# Patient Record
Sex: FEMALE | Race: WHITE | Employment: OTHER | URBAN - METROPOLITAN AREA
[De-identification: names, ages, dates, MRNs, and addresses within clinical notes are randomized per-mention and may not be internally consistent; named-entity substitution may affect disease eponyms.]

---

## 2018-10-08 ENCOUNTER — HOSPITAL ENCOUNTER (EMERGENCY)
Age: 68
Discharge: HOME | End: 2018-10-08
Payer: MEDICARE

## 2018-10-08 ENCOUNTER — APPOINTMENT (OUTPATIENT)
Dept: CT IMAGING | Age: 68
End: 2018-10-08
Payer: MEDICARE

## 2018-10-08 VITALS
SYSTOLIC BLOOD PRESSURE: 123 MMHG | HEIGHT: 62 IN | TEMPERATURE: 97.5 F | WEIGHT: 187 LBS | HEART RATE: 80 BPM | DIASTOLIC BLOOD PRESSURE: 71 MMHG | BODY MASS INDEX: 34.41 KG/M2 | OXYGEN SATURATION: 94 % | RESPIRATION RATE: 15 BRPM
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
## 2018-10-08 DIAGNOSIS — S00.531A CONTUSION OF LIP, INITIAL ENCOUNTER: ICD-10-CM

## 2018-10-08 DIAGNOSIS — S09.90XA INJURY OF HEAD, INITIAL ENCOUNTER: ICD-10-CM

## 2018-10-08 DIAGNOSIS — S02.2XXA CLOSED FRACTURE OF NASAL BONE, INITIAL ENCOUNTER: ICD-10-CM

## 2018-10-08 PROCEDURE — 70450 CT HEAD/BRAIN W/O DYE: CPT

## 2018-10-08 PROCEDURE — 6370000000 HC RX 637 (ALT 250 FOR IP): Performed by: PHYSICIAN ASSISTANT

## 2018-10-08 PROCEDURE — 72125 CT NECK SPINE W/O DYE: CPT

## 2018-10-08 PROCEDURE — 70486 CT MAXILLOFACIAL W/O DYE: CPT

## 2018-10-08 PROCEDURE — 99284 EMERGENCY DEPT VISIT MOD MDM: CPT

## 2018-10-08 RX ORDER — HYDROCODONE BITARTRATE AND ACETAMINOPHEN 5; 325 MG/1; MG/1
1 TABLET ORAL ONCE
Status: COMPLETED | OUTPATIENT
Start: 2018-10-08 | End: 2018-10-08

## 2018-10-08 RX ORDER — HYDROCODONE BITARTRATE AND ACETAMINOPHEN 5; 325 MG/1; MG/1
1 TABLET ORAL EVERY 6 HOURS PRN
Qty: 10 TABLET | Refills: 0 | Status: SHIPPED | OUTPATIENT
Start: 2018-10-08 | End: 2018-10-11

## 2018-10-08 RX ORDER — AMOXICILLIN AND CLAVULANATE POTASSIUM 875; 125 MG/1; MG/1
1 TABLET, FILM COATED ORAL ONCE
Status: COMPLETED | OUTPATIENT
Start: 2018-10-08 | End: 2018-10-08

## 2018-10-08 RX ORDER — AMOXICILLIN AND CLAVULANATE POTASSIUM 875; 125 MG/1; MG/1
1 TABLET, FILM COATED ORAL 2 TIMES DAILY
Qty: 20 TABLET | Refills: 0 | Status: SHIPPED | OUTPATIENT
Start: 2018-10-08 | End: 2018-10-18

## 2018-10-08 RX ADMIN — AMOXICILLIN AND CLAVULANATE POTASSIUM 1 TABLET: 875; 125 TABLET, FILM COATED ORAL at 17:19

## 2018-10-08 RX ADMIN — HYDROCODONE BITARTRATE AND ACETAMINOPHEN 1 TABLET: 5; 325 TABLET ORAL at 15:50

## 2018-10-08 ASSESSMENT — PAIN SCALES - GENERAL
PAINLEVEL_OUTOF10: 4
PAINLEVEL_OUTOF10: 4
PAINLEVEL_OUTOF10: 0

## 2018-10-08 ASSESSMENT — PAIN DESCRIPTION - PAIN TYPE: TYPE: ACUTE PAIN

## 2018-10-08 ASSESSMENT — PAIN SCALES - WONG BAKER: WONGBAKER_NUMERICALRESPONSE: 0

## 2018-10-08 ASSESSMENT — PAIN DESCRIPTION - LOCATION: LOCATION: NOSE

## 2018-10-08 NOTE — ED PROVIDER NOTES
Evaluated by advanced practice provider          [facility]  eMERGENCY dEPARTMENT eNCOUnter        Pt Name: Brittney Merino  MRN: [id number]  [place] 1950  Date of evaluation: 10/8/2018  Provider: Luz Maria Jimenez PA-C  PCP: Elodia Chen  ED Attending: No att. providers found    [facility]       Chief Complaint   Patient presents with    Fall     Pt fell and hit nose on cement. Denies other injury. Denies blood thinner use.  +laceration to nose. HISTORY OF PRESENT ILLNESS   (Location/Symptom, Timing/Onset, Context/Setting, Quality, Duration, Modifying Factors, Severity)  Note limiting factors. Brittney Merino is a 77 y.o. female  presents to the emergency department after a fall. The patient states that she was walking around the car and tripped over a extension cord. She states that she fell face first and hit her nose and face on the cement. She denies any loss of consciousness, headache, visual change, nausea, vomiting, neck pain, back pain, weakness, numbness, tingling, confusion or abnormal gait. She states that she does use a cane for ambulation. She states that her nose and lip hurt and she describes it as a 4 out of 10 pain, but cannot characterize it further. She has had some epistaxis that has stopped since. She denies being on any blood thinners. Nursing Notes were all reviewed and agreed with or any disagreements were addressed  in the HPI. REVIEW OF SYSTEMS    (2-9 systems for level 4, 10 or more for level 5)     Review of Systems    Positives and Pertinent negatives as per HPI. Except as noted above in the ROS, all other systems were reviewed and negative.        PAST MEDICAL HISTORY     Past Medical History:   Diagnosis Date    Diabetes mellitus ([address]     Hyperlipidemia     Hypertension     Kidney stone     Thyroid cancer [facility]     Thyroid disease          SURGICAL HISTORY       Past Surgical History:   Procedure Laterality Date    bridge. Currently hemostatic, but mildly tender. Eyes: Pupils are equal, round, and reactive to light. Conjunctivae and EOM are normal. Right eye exhibits no discharge. Left eye exhibits no discharge. No scleral icterus. Neck: Normal range of motion. Neck supple. Cardiovascular: Normal rate, regular rhythm, normal heart sounds and intact distal pulses. Exam reveals no gallop and no friction rub. No murmur heard. Pulmonary/Chest: Effort normal and breath sounds normal. No respiratory distress. She has no wheezes. She has no rales. She exhibits no tenderness. Musculoskeletal:        Cervical back: Normal.        Thoracic back: Normal.        Lumbar back: Normal.   Neurological: She is alert and oriented to person, place, and time. She has normal strength and normal reflexes. She displays no atrophy, no tremor and normal reflexes. No cranial nerve deficit or sensory deficit. She exhibits normal muscle tone. She displays no seizure activity. Coordination normal.   Reflex Scores:       Tricep reflexes are 2+ on the right side and 2+ on the left side. Bicep reflexes are 2+ on the right side and 2+ on the left side. Brachioradialis reflexes are 2+ on the right side and 2+ on the left side. Patellar reflexes are 2+ on the right side and 2+ on the left side. Achilles reflexes are 2+ on the right side and 2+ on the left side. Finger-to-nose normal bilaterally  No pronator drift. Skin: Skin is warm and dry. She is not diaphoretic. No pallor. Psychiatric: She has a normal mood and affect. Her behavior is normal. Thought content normal.   Nursing note and vitals reviewed. DIAGNOSTIC RESULTS   LABS:    Labs Reviewed - No data to display    All other labs were within normal range or not returned as of this dictation. EKG:  All EKG's are interpreted by the Emergency Department Physician who either signs or Co-signs this chart in the absence of a cardiologist.  Please see their note Relevant Medical/Surgical History: htn dm thyroid cancer FINDINGS: BRAIN/VENTRICLES: There is no acute intracranial hemorrhage, mass effect or midline shift. No abnormal extra-axial fluid collection. The gray-white differentiation is maintained without evidence of an acute infarct. There is no evidence of hydrocephalus. ORBITS: The visualized portion of the orbits demonstrate no acute abnormality. SINUSES: Note is made of nondisplaced nasal bone fractures. There is also increased density seen within the maxillary sinuses. The mastoid air cells are clear. Duane Bourdon SOFT TISSUES/SKULL:  No acute abnormality of the visualized skull or soft tissues. 1. No acute intracranial abnormality. 2. Nasal bone fractures with increased density involving the maxillary sinuses. I would suggest CT scan face to evaluate for other facial fractures. Ct Facial Bones Wo Contrast    Result Date: 10/8/2018  EXAMINATION: CT OF THE FACE WITHOUT CONTRAST  10/8/2018 4:24 pm TECHNIQUE: CT of the face was performed without the administration of intravenous contrast. Multiplanar reformatted images are provided for review. Dose modulation, iterative reconstruction, and/or weight based adjustment of the mA/kV was utilized to reduce the radiation dose to as low as reasonably achievable. COMPARISON: CT head earlier today HISTORY: ORDERING SYSTEM PROVIDED HISTORY: fall; head injury, nose swelling, bruising and laceration TECHNOLOGIST PROVIDED HISTORY: Ordering Physician Provided Reason for Exam: Fall (Pt fell and hit nose on cement. Denies other injury. Denies blood thinner use.  + laceration to nose.) Acuity: Acute Type of Exam: Initial Relevant Medical/Surgical History: htn dm thyroid cancer FINDINGS: FACIAL BONES:  The maxilla, pterygoid plates and zygomatic arches are intact. The mandible is intact. The mandibular condyles are normally situated. The maxillary nasal processes are intact. Nondisplaced right nasal bone fracture.   Nondisplaced degenerative disease of the cervical spine. SOFT TISSUES: There is no prevertebral soft tissue swelling. 1. No acute abnormality involving the cervical spine. 2. Grade 1 anterolisthesis of C3 on 4 likely due to degenerative disease. PROCEDURES   Unless otherwise noted below, none     Lac Repair  Date/Time: 10/8/2018 6:04 PM  Performed by: Tony Mercado by: Zeeshan Macdonald     Consent:     Consent obtained:  Verbal    Consent given by:  Patient  Anesthesia (see MAR for exact dosages): Anesthesia method:  Local infiltration    Local anesthetic:  Bupivacaine 0.25% w/o epi  Laceration details:     Location:  Face    Face location:  Nose    Length (cm):  1.5    Depth (mm):  3  Repair type:     Repair type:  Simple  Exploration:     Hemostasis achieved with:  Direct pressure    Wound exploration: wound explored through full range of motion and entire depth of wound probed and visualized      Wound extent: no areolar tissue violation noted, no fascia violation noted, no foreign bodies/material noted, no muscle damage noted, no nerve damage noted, no tendon damage noted, no underlying fracture noted and no vascular damage noted      Contaminated: no    Treatment:     Area cleansed with:  Dena    Amount of cleaning:  Standard    Irrigation solution:  Tap water    Irrigation method:  Tap    Visualized foreign bodies/material removed: no    Skin repair:     Repair method:  Sutures    Suture size:  0-0    Suture material:  Prolene    Suture technique:  Simple interrupted    Number of sutures:  5  Approximation:     Approximation:  Close    Vermilion border: well-aligned    Post-procedure details:     Dressing:  Open (no dressing)    Patient tolerance of procedure:   Tolerated well, no immediate complications        CRITICAL CARE TIME   N/A    CONSULTS:  None      EMERGENCY DEPARTMENT COURSE and DIFFERENTIAL DIAGNOSIS/MDM:   Vitals:    Vitals:    10/08/18 1520 10/08/18 1625   BP: (!) 158/91

## 2018-10-08 NOTE — ED TRIAGE NOTES
Chief Complaint   Patient presents with    Fall     Pt fell and hit nose on cement. Denies other injury. Denies blood thinner use.  +laceration to nose.

## 2021-08-10 ENCOUNTER — OFFICE VISIT (OUTPATIENT)
Dept: ORTHOPEDIC SURGERY | Age: 71
End: 2021-08-10
Payer: MEDICARE

## 2021-08-10 VITALS — HEIGHT: 62 IN | BODY MASS INDEX: 33.68 KG/M2 | WEIGHT: 183 LBS

## 2021-08-10 DIAGNOSIS — M25.562 ACUTE PAIN OF LEFT KNEE: ICD-10-CM

## 2021-08-10 DIAGNOSIS — M17.11 PRIMARY OSTEOARTHRITIS OF RIGHT KNEE: Primary | ICD-10-CM

## 2021-08-10 PROCEDURE — 1123F ACP DISCUSS/DSCN MKR DOCD: CPT | Performed by: ORTHOPAEDIC SURGERY

## 2021-08-10 PROCEDURE — G8419 CALC BMI OUT NRM PARAM NOF/U: HCPCS | Performed by: ORTHOPAEDIC SURGERY

## 2021-08-10 PROCEDURE — 4040F PNEUMOC VAC/ADMIN/RCVD: CPT | Performed by: ORTHOPAEDIC SURGERY

## 2021-08-10 PROCEDURE — G8400 PT W/DXA NO RESULTS DOC: HCPCS | Performed by: ORTHOPAEDIC SURGERY

## 2021-08-10 PROCEDURE — 1090F PRES/ABSN URINE INCON ASSESS: CPT | Performed by: ORTHOPAEDIC SURGERY

## 2021-08-10 PROCEDURE — G8427 DOCREV CUR MEDS BY ELIG CLIN: HCPCS | Performed by: ORTHOPAEDIC SURGERY

## 2021-08-10 PROCEDURE — 1036F TOBACCO NON-USER: CPT | Performed by: ORTHOPAEDIC SURGERY

## 2021-08-10 PROCEDURE — 3017F COLORECTAL CA SCREEN DOC REV: CPT | Performed by: ORTHOPAEDIC SURGERY

## 2021-08-10 PROCEDURE — 99203 OFFICE O/P NEW LOW 30 MIN: CPT | Performed by: ORTHOPAEDIC SURGERY

## 2021-08-10 NOTE — PROGRESS NOTES
Socioeconomic History    Marital status:      Spouse name: Not on file    Number of children: Not on file    Years of education: Not on file    Highest education level: Not on file   Occupational History    Not on file   Tobacco Use    Smoking status: Never Smoker    Smokeless tobacco: Never Used   Vaping Use    Vaping Use: Never used   Substance and Sexual Activity    Alcohol use: No    Drug use: No    Sexual activity: Yes     Partners: Male   Other Topics Concern    Not on file   Social History Narrative    Not on file     Social Determinants of Health     Financial Resource Strain:     Difficulty of Paying Living Expenses:    Food Insecurity:     Worried About Running Out of Food in the Last Year:     [address] in the Last Year:    Transportation Needs:     Lack of Transportation (Medical):  Lack of Transportation (Non-Medical):    Physical Activity:     Days of Exercise per Week:     Minutes of Exercise per Session:    Stress:     Feeling of Stress :    Social Connections:     Frequency of Communication with Friends and Family:     Frequency of Social Gatherings with Friends and Family:     Attends Restoration Services:     Active Member of Clubs or Organizations:     Attends Club or Organization Meetings:     Marital Status:    Intimate Partner Violence:     Fear of Current or Ex-Partner:     Emotionally Abused:     Physically Abused:     Sexually Abused:        Family HISTORY    No family history on file. PHYSICAL EXAM    Vital Signs:  Ht 5' 2\" (1.575 m)   Wt 183 lb (83 kg)   BMI 33.47 kg/m²   General Appearance:  Normal body habitus. Alert and oriented to person, place, and time. Affect:  Normal.   Gait: Antalgic with mild genu varum. Good balance and coordination. Skin:  Intact. Sensation:  Intact. Strength:  Intact. Reflexes:  Intact. Pulses:  Intact.    Knee Exam:    Effusion: 1+    Range of Motion Right Left   Extension 0 0   Flexion 110 110 Provocative Test Right Left    Positive Negative Positive Negative   Anterior drawer [] [] [] []   Lachman [] [] [] []   Posterior drawer [] [] [] []   Varus testing [] [x] [] [x]   Valgus testing [] [x] [] [x]   Joint line tenderness [x] [] [] [x]     Additional Exam Comments: Her neurocirculatory lymphatic exam is unremarkable and symmetric to both lower extremities. She does have medial compartment pain to direct palpation and most of it appears to be along the proximal tibia on the medial side anteriorly. She has no gross instability but does feel bone-on-bone changes when she goes to  the shower to wash the bottom of her foot. IMAGING STUDIES    X-rays 3 views of the right knee taken today demonstrate severe osteoarthritis mostly in the medial compartment of the right knee. She is not quite bone-on-bone but its less than 50% of joint space remaining. IMPRESSION    Right knee pain secondary to osteoarthritis with possible stress reaction    PLAN      1. Conservative care options including physical therapy, NSAIDs, bracing, and activity modification were discussed. 2.  The indications for therapeutic injections were discussed. 3.  The indications for additional imaging studies were discussed. 4.  After considering the various options discussed, the patient elected to pursue a course that includes an MRI of the right knee since she is failed to improve with other modalities prior to consideration of knee replacement surgery. She just uses a cane for ambulation and prefers not to have knee replacement surgery if at all possible. I told her since she has had some benefit from Visco in the past that she may be a candidate for that in the future but that we would do something other than Durolane most likely.   Additionally I told her that if the MRI shows that she has something such as a stress reaction that she may benefit from a newer procedure to fix that with bone substitute but that I would not recommend anything along the lines of arthroscopy just for meniscectomy since she has severe arthritic change in the medial compartment.

## 2021-08-17 ENCOUNTER — HOSPITAL ENCOUNTER (OUTPATIENT)
Dept: MRI IMAGING | Age: 71
Discharge: HOME | End: 2021-08-17
Payer: MEDICARE

## 2021-08-17 DIAGNOSIS — M25.562 ACUTE PAIN OF LEFT KNEE: ICD-10-CM

## 2021-08-17 PROCEDURE — 73721 MRI JNT OF LWR EXTRE W/O DYE: CPT

## 2021-08-25 ENCOUNTER — OFFICE VISIT (OUTPATIENT)
Dept: ORTHOPEDIC SURGERY | Age: 71
End: 2021-08-25
Payer: MEDICARE

## 2021-08-25 VITALS — BODY MASS INDEX: 33.68 KG/M2 | WEIGHT: 183 LBS | HEIGHT: 62 IN

## 2021-08-25 DIAGNOSIS — M23.300 DEGENERATIVE TEAR OF LATERAL MENISCUS OF RIGHT KNEE: ICD-10-CM

## 2021-08-25 DIAGNOSIS — M23.203 DEGENERATIVE TEAR OF MEDIAL MENISCUS OF RIGHT KNEE: Primary | ICD-10-CM

## 2021-08-25 DIAGNOSIS — M84.469G INSUFFICIENCY FRACTURE OF TIBIA WITH DELAYED HEALING, SUBSEQUENT ENCOUNTER: ICD-10-CM

## 2021-08-25 PROBLEM — M84.469A INSUFFICIENCY FRACTURE OF TIBIA: Status: ACTIVE | Noted: 2021-08-25

## 2021-08-25 PROCEDURE — 1036F TOBACCO NON-USER: CPT | Performed by: ORTHOPAEDIC SURGERY

## 2021-08-25 PROCEDURE — 4040F PNEUMOC VAC/ADMIN/RCVD: CPT | Performed by: ORTHOPAEDIC SURGERY

## 2021-08-25 PROCEDURE — 1090F PRES/ABSN URINE INCON ASSESS: CPT | Performed by: ORTHOPAEDIC SURGERY

## 2021-08-25 PROCEDURE — G8417 CALC BMI ABV UP PARAM F/U: HCPCS | Performed by: ORTHOPAEDIC SURGERY

## 2021-08-25 PROCEDURE — 99213 OFFICE O/P EST LOW 20 MIN: CPT | Performed by: ORTHOPAEDIC SURGERY

## 2021-08-25 PROCEDURE — G8400 PT W/DXA NO RESULTS DOC: HCPCS | Performed by: ORTHOPAEDIC SURGERY

## 2021-08-25 PROCEDURE — 3017F COLORECTAL CA SCREEN DOC REV: CPT | Performed by: ORTHOPAEDIC SURGERY

## 2021-08-25 PROCEDURE — G8427 DOCREV CUR MEDS BY ELIG CLIN: HCPCS | Performed by: ORTHOPAEDIC SURGERY

## 2021-08-25 PROCEDURE — 1123F ACP DISCUSS/DSCN MKR DOCD: CPT | Performed by: ORTHOPAEDIC SURGERY

## 2021-09-02 DIAGNOSIS — M23.203 DEGENERATIVE TEAR OF MEDIAL MENISCUS OF RIGHT KNEE: Primary | ICD-10-CM

## 2021-09-02 DIAGNOSIS — M84.469G INSUFFICIENCY FRACTURE OF TIBIA WITH DELAYED HEALING, SUBSEQUENT ENCOUNTER: ICD-10-CM

## 2021-09-14 ENCOUNTER — TELEPHONE (OUTPATIENT)
Dept: ORTHOPEDIC SURGERY | Age: 71
End: 2021-09-14

## 2021-09-14 NOTE — TELEPHONE ENCOUNTER
Auth: NPR  Date: 10/04/2021  Reference # None  Spoke with: None  Type of SX: Outpatient  Location: [facility] [place]  CPT 29624, [id number]   SX area: Rt knee  Insurance: Medicare A&B

## 2021-09-28 ENCOUNTER — HOSPITAL ENCOUNTER (OUTPATIENT)
Age: 71
Discharge: HOME | End: 2021-09-28
Payer: MEDICARE

## 2021-09-28 DIAGNOSIS — M84.469G INSUFFICIENCY FRACTURE OF TIBIA WITH DELAYED HEALING, SUBSEQUENT ENCOUNTER: ICD-10-CM

## 2021-09-28 DIAGNOSIS — M23.203 DEGENERATIVE TEAR OF MEDIAL MENISCUS OF RIGHT KNEE: ICD-10-CM

## 2021-09-28 PROCEDURE — U0005 INFEC AGEN DETEC AMPLI PROBE: HCPCS

## 2021-09-28 PROCEDURE — U0003 INFECTIOUS AGENT DETECTION BY NUCLEIC ACID (DNA OR RNA); SEVERE ACUTE RESPIRATORY SYNDROME CORONAVIRUS 2 (SARS-COV-2) (CORONAVIRUS DISEASE [COVID-19]), AMPLIFIED PROBE TECHNIQUE, MAKING USE OF HIGH THROUGHPUT TECHNOLOGIES AS DESCRIBED BY CMS-2020-01-R: HCPCS

## 2021-09-29 LAB — SARS-COV-2: NOT DETECTED

## 2021-10-03 ENCOUNTER — ANESTHESIA EVENT (OUTPATIENT)
Dept: OPERATING ROOM | Age: 71
End: 2021-10-03
Payer: MEDICARE

## 2021-10-04 ENCOUNTER — ANESTHESIA (OUTPATIENT)
Dept: OPERATING ROOM | Age: 71
End: 2021-10-04
Payer: MEDICARE

## 2021-10-04 ENCOUNTER — HOSPITAL ENCOUNTER (OUTPATIENT)
Age: 71
Setting detail: OUTPATIENT SURGERY
Discharge: HOME | End: 2021-10-04
Attending: ORTHOPAEDIC SURGERY | Admitting: ORTHOPAEDIC SURGERY
Payer: MEDICARE

## 2021-10-04 ENCOUNTER — APPOINTMENT (OUTPATIENT)
Dept: GENERAL RADIOLOGY | Age: 71
End: 2021-10-04
Attending: ORTHOPAEDIC SURGERY
Payer: MEDICARE

## 2021-10-04 VITALS
SYSTOLIC BLOOD PRESSURE: 129 MMHG | OXYGEN SATURATION: 95 % | TEMPERATURE: 97.2 F | WEIGHT: 186 LBS | HEART RATE: 74 BPM | HEIGHT: 61 IN | RESPIRATION RATE: 19 BRPM | BODY MASS INDEX: 35.12 KG/M2 | DIASTOLIC BLOOD PRESSURE: 58 MMHG

## 2021-10-04 VITALS — SYSTOLIC BLOOD PRESSURE: 100 MMHG | OXYGEN SATURATION: 93 % | DIASTOLIC BLOOD PRESSURE: 55 MMHG

## 2021-10-04 DIAGNOSIS — M23.203 DEGENERATIVE TEAR OF MEDIAL MENISCUS OF RIGHT KNEE: Primary | ICD-10-CM

## 2021-10-04 DIAGNOSIS — M84.469G INSUFFICIENCY FRACTURE OF TIBIA WITH DELAYED HEALING, SUBSEQUENT ENCOUNTER: ICD-10-CM

## 2021-10-04 DIAGNOSIS — M23.300 DEGENERATIVE TEAR OF LATERAL MENISCUS OF RIGHT KNEE: ICD-10-CM

## 2021-10-04 DIAGNOSIS — M25.562 ACUTE PAIN OF LEFT KNEE: ICD-10-CM

## 2021-10-04 LAB
GLUCOSE BLD-MCNC: 151 MG/DL (ref 70–99)
PERFORMED ON: ABNORMAL

## 2021-10-04 PROCEDURE — 7100000001 HC PACU RECOVERY - ADDTL 15 MIN: Performed by: ORTHOPAEDIC SURGERY

## 2021-10-04 PROCEDURE — C1713 ANCHOR/SCREW BN/BN,TIS/BN: HCPCS | Performed by: ORTHOPAEDIC SURGERY

## 2021-10-04 PROCEDURE — 2500000003 HC RX 250 WO HCPCS: Performed by: ANESTHESIOLOGY

## 2021-10-04 PROCEDURE — 3209999900 FLUORO FOR SURGICAL PROCEDURES

## 2021-10-04 PROCEDURE — 3600000004 HC SURGERY LEVEL 4 BASE: Performed by: ORTHOPAEDIC SURGERY

## 2021-10-04 PROCEDURE — 2580000003 HC RX 258: Performed by: ANESTHESIOLOGY

## 2021-10-04 PROCEDURE — 2709999900 HC NON-CHARGEABLE SUPPLY: Performed by: ORTHOPAEDIC SURGERY

## 2021-10-04 PROCEDURE — 6370000000 HC RX 637 (ALT 250 FOR IP): Performed by: ANESTHESIOLOGY

## 2021-10-04 PROCEDURE — 6360000002 HC RX W HCPCS: Performed by: ANESTHESIOLOGY

## 2021-10-04 PROCEDURE — 3700000001 HC ADD 15 MINUTES (ANESTHESIA): Performed by: ORTHOPAEDIC SURGERY

## 2021-10-04 PROCEDURE — 2580000003 HC RX 258: Performed by: ORTHOPAEDIC SURGERY

## 2021-10-04 PROCEDURE — 7100000010 HC PHASE II RECOVERY - FIRST 15 MIN: Performed by: ORTHOPAEDIC SURGERY

## 2021-10-04 PROCEDURE — L1830 KO IMMOB CANVAS LONG PRE OTS: HCPCS | Performed by: ORTHOPAEDIC SURGERY

## 2021-10-04 PROCEDURE — 64447 NJX AA&/STRD FEMORAL NRV IMG: CPT | Performed by: ANESTHESIOLOGY

## 2021-10-04 PROCEDURE — 3700000000 HC ANESTHESIA ATTENDED CARE: Performed by: ORTHOPAEDIC SURGERY

## 2021-10-04 PROCEDURE — 7100000000 HC PACU RECOVERY - FIRST 15 MIN: Performed by: ORTHOPAEDIC SURGERY

## 2021-10-04 PROCEDURE — 7100000011 HC PHASE II RECOVERY - ADDTL 15 MIN: Performed by: ORTHOPAEDIC SURGERY

## 2021-10-04 PROCEDURE — 6360000002 HC RX W HCPCS: Performed by: NURSE ANESTHETIST, CERTIFIED REGISTERED

## 2021-10-04 PROCEDURE — 2500000003 HC RX 250 WO HCPCS: Performed by: NURSE ANESTHETIST, CERTIFIED REGISTERED

## 2021-10-04 PROCEDURE — 6360000002 HC RX W HCPCS: Performed by: ORTHOPAEDIC SURGERY

## 2021-10-04 PROCEDURE — 3600000014 HC SURGERY LEVEL 4 ADDTL 15MIN: Performed by: ORTHOPAEDIC SURGERY

## 2021-10-04 DEVICE — IMPLANTABLE DEVICE
Type: IMPLANTABLE DEVICE | Status: FUNCTIONAL
Brand: SCP® PF KNEE KIT

## 2021-10-04 RX ORDER — LABETALOL HYDROCHLORIDE 5 MG/ML
5 INJECTION, SOLUTION INTRAVENOUS EVERY 10 MIN PRN
Status: DISCONTINUED | OUTPATIENT
Start: 2021-10-04 | End: 2021-10-04 | Stop reason: HOSPADM

## 2021-10-04 RX ORDER — PROMETHAZINE HYDROCHLORIDE 25 MG/ML
6.25 INJECTION, SOLUTION INTRAMUSCULAR; INTRAVENOUS
Status: DISCONTINUED | OUTPATIENT
Start: 2021-10-04 | End: 2021-10-04 | Stop reason: HOSPADM

## 2021-10-04 RX ORDER — SODIUM CHLORIDE, SODIUM LACTATE, POTASSIUM CHLORIDE, CALCIUM CHLORIDE 600; 310; 30; 20 MG/100ML; MG/100ML; MG/100ML; MG/100ML
INJECTION, SOLUTION INTRAVENOUS CONTINUOUS
Status: DISCONTINUED | OUTPATIENT
Start: 2021-10-04 | End: 2021-10-04 | Stop reason: HOSPADM

## 2021-10-04 RX ORDER — MORPHINE SULFATE 2 MG/ML
1 INJECTION, SOLUTION INTRAMUSCULAR; INTRAVENOUS EVERY 5 MIN PRN
Status: DISCONTINUED | OUTPATIENT
Start: 2021-10-04 | End: 2021-10-04 | Stop reason: HOSPADM

## 2021-10-04 RX ORDER — OXYCODONE HYDROCHLORIDE AND ACETAMINOPHEN 5; 325 MG/1; MG/1
2 TABLET ORAL PRN
Status: COMPLETED | OUTPATIENT
Start: 2021-10-04 | End: 2021-10-04

## 2021-10-04 RX ORDER — HYDROCODONE BITARTRATE AND ACETAMINOPHEN 7.5; 325 MG/1; MG/1
1 TABLET ORAL EVERY 6 HOURS PRN
Qty: 28 TABLET | Refills: 0 | Status: SHIPPED | OUTPATIENT
Start: 2021-10-04 | End: 2021-10-11

## 2021-10-04 RX ORDER — DEXAMETHASONE SODIUM PHOSPHATE 4 MG/ML
INJECTION, SOLUTION INTRA-ARTICULAR; INTRALESIONAL; INTRAMUSCULAR; INTRAVENOUS; SOFT TISSUE PRN
Status: DISCONTINUED | OUTPATIENT
Start: 2021-10-04 | End: 2021-10-04 | Stop reason: SDUPTHER

## 2021-10-04 RX ORDER — HYDRALAZINE HYDROCHLORIDE 20 MG/ML
5 INJECTION INTRAMUSCULAR; INTRAVENOUS EVERY 10 MIN PRN
Status: DISCONTINUED | OUTPATIENT
Start: 2021-10-04 | End: 2021-10-04 | Stop reason: HOSPADM

## 2021-10-04 RX ORDER — LIDOCAINE HYDROCHLORIDE 20 MG/ML
INJECTION, SOLUTION INFILTRATION; PERINEURAL PRN
Status: DISCONTINUED | OUTPATIENT
Start: 2021-10-04 | End: 2021-10-04 | Stop reason: SDUPTHER

## 2021-10-04 RX ORDER — OXYCODONE HYDROCHLORIDE AND ACETAMINOPHEN 5; 325 MG/1; MG/1
1 TABLET ORAL PRN
Status: COMPLETED | OUTPATIENT
Start: 2021-10-04 | End: 2021-10-04

## 2021-10-04 RX ORDER — PROPOFOL 10 MG/ML
INJECTION, EMULSION INTRAVENOUS PRN
Status: DISCONTINUED | OUTPATIENT
Start: 2021-10-04 | End: 2021-10-04 | Stop reason: SDUPTHER

## 2021-10-04 RX ORDER — LIDOCAINE HYDROCHLORIDE 10 MG/ML
2 INJECTION, SOLUTION INFILTRATION; PERINEURAL
Status: DISCONTINUED | OUTPATIENT
Start: 2021-10-04 | End: 2021-10-04 | Stop reason: HOSPADM

## 2021-10-04 RX ORDER — MIDAZOLAM HYDROCHLORIDE 1 MG/ML
INJECTION INTRAMUSCULAR; INTRAVENOUS PRN
Status: DISCONTINUED | OUTPATIENT
Start: 2021-10-04 | End: 2021-10-04 | Stop reason: SDUPTHER

## 2021-10-04 RX ORDER — ONDANSETRON 2 MG/ML
4 INJECTION INTRAMUSCULAR; INTRAVENOUS
Status: DISCONTINUED | OUTPATIENT
Start: 2021-10-04 | End: 2021-10-04 | Stop reason: HOSPADM

## 2021-10-04 RX ORDER — MIDAZOLAM HYDROCHLORIDE 1 MG/ML
INJECTION INTRAMUSCULAR; INTRAVENOUS
Status: COMPLETED
Start: 2021-10-04 | End: 2021-10-04

## 2021-10-04 RX ORDER — BUPIVACAINE HYDROCHLORIDE 5 MG/ML
INJECTION, SOLUTION EPIDURAL; INTRACAUDAL PRN
Status: DISCONTINUED | OUTPATIENT
Start: 2021-10-04 | End: 2021-10-04 | Stop reason: SDUPTHER

## 2021-10-04 RX ORDER — MORPHINE SULFATE 2 MG/ML
2 INJECTION, SOLUTION INTRAMUSCULAR; INTRAVENOUS EVERY 5 MIN PRN
Status: DISCONTINUED | OUTPATIENT
Start: 2021-10-04 | End: 2021-10-04 | Stop reason: HOSPADM

## 2021-10-04 RX ORDER — MAGNESIUM HYDROXIDE 1200 MG/15ML
LIQUID ORAL CONTINUOUS PRN
Status: COMPLETED | OUTPATIENT
Start: 2021-10-04 | End: 2021-10-04

## 2021-10-04 RX ORDER — ONDANSETRON 2 MG/ML
INJECTION INTRAMUSCULAR; INTRAVENOUS PRN
Status: DISCONTINUED | OUTPATIENT
Start: 2021-10-04 | End: 2021-10-04 | Stop reason: SDUPTHER

## 2021-10-04 RX ORDER — FENTANYL CITRATE 50 UG/ML
INJECTION, SOLUTION INTRAMUSCULAR; INTRAVENOUS PRN
Status: DISCONTINUED | OUTPATIENT
Start: 2021-10-04 | End: 2021-10-04 | Stop reason: SDUPTHER

## 2021-10-04 RX ORDER — MEPERIDINE HYDROCHLORIDE 25 MG/ML
12.5 INJECTION INTRAMUSCULAR; INTRAVENOUS; SUBCUTANEOUS EVERY 5 MIN PRN
Status: DISCONTINUED | OUTPATIENT
Start: 2021-10-04 | End: 2021-10-04 | Stop reason: HOSPADM

## 2021-10-04 RX ORDER — DIPHENHYDRAMINE HYDROCHLORIDE 50 MG/ML
12.5 INJECTION INTRAMUSCULAR; INTRAVENOUS
Status: DISCONTINUED | OUTPATIENT
Start: 2021-10-04 | End: 2021-10-04 | Stop reason: HOSPADM

## 2021-10-04 RX ADMIN — FENTANYL CITRATE 50 MCG: 50 INJECTION INTRAMUSCULAR; INTRAVENOUS at 08:26

## 2021-10-04 RX ADMIN — MIDAZOLAM 2 MG: 1 INJECTION INTRAMUSCULAR; INTRAVENOUS at 08:01

## 2021-10-04 RX ADMIN — Medication 1500 MG: at 08:19

## 2021-10-04 RX ADMIN — ONDANSETRON 4 MG: 2 INJECTION, SOLUTION INTRAMUSCULAR; INTRAVENOUS at 08:30

## 2021-10-04 RX ADMIN — DEXAMETHASONE SODIUM PHOSPHATE 4 MG: 4 INJECTION, SOLUTION INTRAMUSCULAR; INTRAVENOUS at 08:30

## 2021-10-04 RX ADMIN — OXYCODONE HYDROCHLORIDE AND ACETAMINOPHEN 1 TABLET: 5; 325 TABLET ORAL at 10:00

## 2021-10-04 RX ADMIN — SODIUM CHLORIDE, POTASSIUM CHLORIDE, SODIUM LACTATE AND CALCIUM CHLORIDE: 600; 310; 30; 20 INJECTION, SOLUTION INTRAVENOUS at 07:38

## 2021-10-04 RX ADMIN — LIDOCAINE HYDROCHLORIDE 60 MG: 20 INJECTION, SOLUTION INFILTRATION; PERINEURAL at 08:26

## 2021-10-04 RX ADMIN — PROPOFOL 200 MG: 10 INJECTION, EMULSION INTRAVENOUS at 08:26

## 2021-10-04 RX ADMIN — BUPIVACAINE HYDROCHLORIDE 20 ML: 5 INJECTION, SOLUTION EPIDURAL; INTRACAUDAL; PERINEURAL at 08:01

## 2021-10-04 RX ADMIN — FENTANYL CITRATE 50 MCG: 50 INJECTION INTRAMUSCULAR; INTRAVENOUS at 08:35

## 2021-10-04 ASSESSMENT — PULMONARY FUNCTION TESTS
PIF_VALUE: 3
PIF_VALUE: 20
PIF_VALUE: 3
PIF_VALUE: 7
PIF_VALUE: 5
PIF_VALUE: 1
PIF_VALUE: 3
PIF_VALUE: 3
PIF_VALUE: 0
PIF_VALUE: 3
PIF_VALUE: 0
PIF_VALUE: 3
PIF_VALUE: 7
PIF_VALUE: 3
PIF_VALUE: 3
PIF_VALUE: 6
PIF_VALUE: 3
PIF_VALUE: 2
PIF_VALUE: 16
PIF_VALUE: 3
PIF_VALUE: 15
PIF_VALUE: 3
PIF_VALUE: 3
PIF_VALUE: 1
PIF_VALUE: 1
PIF_VALUE: 3
PIF_VALUE: 0
PIF_VALUE: 3
PIF_VALUE: 0
PIF_VALUE: 2
PIF_VALUE: 2
PIF_VALUE: 3

## 2021-10-04 ASSESSMENT — PAIN DESCRIPTION - DESCRIPTORS: DESCRIPTORS: BURNING

## 2021-10-04 ASSESSMENT — PAIN - FUNCTIONAL ASSESSMENT: PAIN_FUNCTIONAL_ASSESSMENT: 0-10

## 2021-10-04 ASSESSMENT — PAIN DESCRIPTION - PAIN TYPE: TYPE: SURGICAL PAIN

## 2021-10-04 ASSESSMENT — PAIN DESCRIPTION - ORIENTATION: ORIENTATION: RIGHT

## 2021-10-04 ASSESSMENT — PAIN DESCRIPTION - LOCATION: LOCATION: KNEE

## 2021-10-04 ASSESSMENT — PAIN SCALES - GENERAL: PAINLEVEL_OUTOF10: 4

## 2021-10-04 NOTE — ANESTHESIA PROCEDURE NOTES
Peripheral Block    Patient location during procedure: pre-op  Staffing  Performed: anesthesiologist   Anesthesiologist: Amanda Bowie MD  Preanesthetic Checklist  Completed: patient identified, IV checked, site marked, risks and benefits discussed, surgical consent, monitors and equipment checked, pre-op evaluation, timeout performed, anesthesia consent given, oxygen available and patient being monitored  Peripheral Block  Patient position: supine  Prep: ChloraPrep  Patient monitoring: IV access  Block type: Femoral  Laterality: right  Injection technique: single-shot  Guidance: ultrasound guided  Infiltration strength: 1 %  Dose: 3 mL  Adductor canal (Low Femoral)  Provider prep: mask and sterile gloves  Needle  Needle type: combined needle/nerve stimulator   Needle gauge: 21 G  Needle length: 10 cm  Needle localization: ultrasound guidance  Assessment  Injection assessment: negative aspiration for heme, no paresthesia on injection and local visualized surrounding nerve on ultrasound  Paresthesia pain: none  Slow fractionated injection: yes  Hemodynamics: stable  Additional Notes  Immediately prior to procedure a \"time out\" was called to verify the correct patient, allergies, laterality, procedure and equipment. Time out performed with [facility] RN    Local Anesthetic: 0.5 %  Bupivacaine plus epi 1 to 200K Amount: 20 ml  in 5 ml increments after negative aspiration each time. to be anatomically normal and there were no abnormal pathologically findings seen.          Reason for block: post-op pain management and at surgeon's request

## 2021-10-04 NOTE — ANESTHESIA PRE PROCEDURE
Department of Anesthesiology  Preprocedure Note       Name:  Mu Payne   Age:  66 y.o.  :  1950                                          MRN:  [id number]         Date:  10/4/2021      Surgeon: Stephan Tarango):  Steve Meek MD    Procedure: Procedure(s):  RIGHT KNEE VIDEO ARTHROSCOPY, MEDIAL AND LATERAL MENISECTOMY, INTERNAL FIXATION MEDIAL TIBIAL PLATEAU INSUFFICIENCY FRACTURE WITH BONE SUBSTITUTE    Medications prior to admission:   Prior to Admission medications    Medication Sig Start Date End Date Taking? Authorizing Provider   levothyroxine (SYNTHROID) 137 MCG tablet Take 125 mcg by mouth Daily    Yes Historical Provider, MD   glimepiride (AMARYL) 4 MG tablet Take 4 mg by mouth every morning (before breakfast)   Yes Historical Provider, MD   lisinopril-hydrochlorothiazide (PRINZIDE;ZESTORETIC) 20-12.5 MG per tablet Take 1 tablet by mouth daily   Yes Historical Provider, MD   saxagliptin (ONGLYZA) 5 MG TABS tablet Take 5 mg by mouth daily   Yes Historical Provider, MD   metoprolol (TOPROL-XL) 50 MG XL tablet Take 50 mg by mouth nightly    Yes Historical Provider, MD   Cholecalciferol (VITAMIN D3) 2000 UNITS CAPS Take by mouth   Yes Historical Provider, MD   Omega-3 Fatty Acids (OMEGA 3 PO) Take by mouth    Historical Provider, MD       Current medications:    Current Facility-Administered Medications   Medication Dose Route Frequency Provider Last Rate Last Admin    vancomycin 1500 mg in dextrose 5% 300 mL IVPB  1,500 mg IntraVENous Once Steve Meek MD        lidocaine 1 % injection 2 mL  2 mL IntraDERmal Once PRN Rupali Ramírez MD        lactated ringers infusion   IntraVENous Continuous Rupali Ramírez MD           Allergies:     Allergies   Allergen Reactions    Metformin And Related Diarrhea and Other (See Comments)     Dizziness      Neosporin [Neomycin-Polymyxin-Gramicidin] Swelling       Problem List:    Patient Active Problem List   Diagnosis Code    Acute pain of left knee M25.562    Degenerative tear of lateral meniscus of right knee M23.300    Insufficiency fracture of tibia M84.469A    Degenerative tear of medial meniscus of right knee M23.203       Past Medical History:        Diagnosis Date    Diabetes mellitus ([facility]     Hyperlipidemia     Hypertension     Kidney stone     Thyroid cancer ([facility]     Thyroid disease        Past Surgical History:        Procedure Laterality Date    CHOLECYSTECTOMY         Social History:    Social History     Tobacco Use    Smoking status: Never Smoker    Smokeless tobacco: Never Used   Substance Use Topics    Alcohol use: No                                Counseling given: Not Answered      Vital Signs (Current):   Vitals:    09/30/21 1049 10/04/21 0721   BP:  130/72   Pulse:  67   Resp:  16   Temp:  97 °F (36.1 °C)   TempSrc:  Infrared   SpO2:  98%   Weight: 186 lb (84.4 kg) 186 lb (84.4 kg)   Height: 5' 1\" (1.549 m) 5' 1\" (1.549 m)                                              BP Readings from Last 3 Encounters:   10/04/21 130/72   10/08/18 123/71   11/23/15 142/75       NPO Status: Time of last liquid consumption: 0000                        Time of last solid consumption: 0000                        Date of last liquid consumption: 10/03/21                        Date of last solid food consumption: 10/03/21    BMI:   Wt Readings from Last 3 Encounters:   10/04/21 186 lb (84.4 kg)   08/25/21 183 lb (83 kg)   08/10/21 183 lb (83 kg)     Body mass index is 35.14 kg/m². CBC: No results found for: WBC, RBC, HGB, HCT, MCV, RDW, PLT    CMP: No results found for: NA, K, CL, CO2, BUN, CREATININE, GFRAA, AGRATIO, LABGLOM, GLUCOSE, PROT, CALCIUM, BILITOT, ALKPHOS, AST, ALT    POC Tests: No results for input(s): POCGLU, POCNA, POCK, POCCL, POCBUN, POCHEMO, POCHCT in the last 72 hours.     Coags: No results found for: PROTIME, INR, APTT    HCG (If Applicable): No results found for: PREGTESTUR, PREGSERUM, HCG, HCGQUANT     ABGs: No results found for: PHART, PO2ART, QWP9YYY, YUM9IFZ, BEART, D8BAELKU     Type & Screen (If Applicable):  No results found for: LABABO, LABRH    Drug/Infectious Status (If Applicable):  No results found for: HIV, HEPCAB    COVID-19 Screening (If Applicable):   Lab Results   Component Value Date    COVID19 Not Detected 09/28/2021           Anesthesia Evaluation   no history of anesthetic complications:   Airway: Mallampati: II  TM distance: >3 FB   Neck ROM: limited  Mouth opening: > = 3 FB Dental:          Pulmonary:Negative Pulmonary ROS                              Cardiovascular:    (+) hypertension:,                   Neuro/Psych:   Negative Neuro/Psych ROS              GI/Hepatic/Renal: Neg GI/Hepatic/Renal ROS            Endo/Other:    (+) DiabetesType II DM, , hypothyroidism::., malignancy/cancer. Abdominal:             Vascular: negative vascular ROS. Other Findings:             Anesthesia Plan      general     ASA 2     (Pt agrees to risks, benefits and alternatives of GA for operative care as well as a femoral nerve block for post operative analgesia. Questions answered. Willing to proceed.)  Induction: intravenous. Anesthetic plan and risks discussed with patient.                       Shana Saavedra MD   10/4/2021

## 2021-10-04 NOTE — BRIEF OP NOTE
Brief Postoperative Note      Patient: Dao Earl  YOB: 1950  MRN: [id number]    Date of Procedure: 10/4/2021    Pre-Op Diagnosis: RIGHT KNEE MEDIAL MENISCUS TEAR, LATERAL MENISCUS TEAR, INSUFFICIENCY FRACTURE MEDIAL TIBIAL PLATEAU    Post-Op Diagnosis: Same       Procedure(s):  RIGHT KNEE VIDEO ARTHROSCOPY, MEDIAL AND LATERAL MENISECTOMY, INTERNAL FIXATION MEDIAL TIBIAL PLATEAU INSUFFICIENCY FRACTURE WITH BONE SUBSTITUTE    Surgeon(s):  Francie Lott MD    Assistant:  Surgical Assistant: Carlos Manuel Benson    Anesthesia: General    Estimated Blood Loss (mL): Minimal    Complications: None    Specimens:   * No specimens in log *    Implants:  Implant Name Type Inv.  Item Serial No.  Lot No. LRB No. Used Action   KIT BNE GRFT DEL KNEE FOR SUBCHONDROPLASTY ACCUPORT  KIT BNE GRFT DEL KNEE FOR Blenda Severe  [employer]- [id number] Right 1 Implanted         Drains: * No LDAs found *    Findings: edward    Electronically signed by Francie Lott MD on 10/4/2021 at 8:49 AM

## 2021-10-04 NOTE — PROGRESS NOTES
Patient sipping soda nad eating crackers. Good pedal pulse to right foot with brisk capillary refill noted.

## 2021-10-04 NOTE — PROGRESS NOTES
Arrived from OR awake and alert. No c/o pain. Dressing/knee immobilizer to right dry and intact with good pedal pulse palpable and brisk capillary refill noted. Ice pack applied. Report received from MAX Wagner RN. Patricia lisa applied.

## 2021-10-04 NOTE — OP NOTE
[address]                 [address] [address]                                OPERATIVE REPORT    PATIENT NAME: Kumar Mcleod                 :        1950  MED REC NO:   [id number]                          ROOM:  ACCOUNT NO:   [de-identified]                           ADMIT DATE: 10/04/2021  PROVIDER:     Kala Hernandez MD    DATE OF PROCEDURE:  10/04/2021    PREOPERATIVE DIAGNOSIS:  Right knee medial and lateral meniscus tears  and insufficiency fracture of the medial tibial plateau. POSTOPERATIVE DIAGNOSIS:  Right knee medial and lateral meniscus tears  and insufficiency fracture of the medial tibial plateau. OPERATION PERFORMED:  Right knee video arthroscopy with partial medial  and lateral meniscectomy and internal fixation of an insufficiency  fracture of the medial tibial plateau with bone substitute under  fluoroscopic guidance. SURGEON:  Kala Hernandez MD    ANESTHESIA:  General, leg block. BLOOD LOSS:  Less than 5 mL. COMPLICATIONS:  None noted. INDICATIONS FOR OPERATION:  This 70-year-old female who had a history,  exam, testing consistent with medial and lateral meniscus tears, and the  MRI also revealed insufficiency fracture/stress reaction of the medial  tibial plateau. After failure of all other modalities, she elected for  further recommendations of surgical intervention. DESCRIPTION OF OPERATION:  The patient was taken to the operating room  where a general anesthetic was obtained and antibiotics were given in IV  piggyback preoperatively and leg block had been performed. Her right  knee and leg were sterilely prepped and draped, and a two-port  arthroscopy of the right knee was carried out in the usual fashion using  a 30-degree arthroscope. Upon entry into the right knee, she was noted  to have a normal patellofemoral articulation.   Medially, she had a  complex tear of the midbody through the posterior horn of the medial  meniscus, and a partial medial meniscectomy was performed leaving a  well-contoured and balanced inner rim. She had some grade 3 and some  focal grade 4 changes of osteoarthritis and medial tibial plateau. ACL  and PCL were intact. Laterally, she had a complex tear of the posterior  horn of the lateral meniscus and partial lateral meniscectomies was  performed with both rigid and power instrumentation leaving a  well-contoured and stable remainder. The knee was then thoroughly  irrigated and evacuated of all loose debris and instilled with 20 mL of  0.25% Marcaine plain. The port was repaired with 4-0 nylon in a  figure-of-eight fashion. Based on preoperative review of this patient's right knee MRI, the  location of the bone marrow lesion, consistent with an insufficiency or  stress fracture, in the medial tibial plateau was identified. Preoperative surgical planning allowed for determination of the optimal  method for accessing the lesion. Intraoperatively, image fluoroscopy  combined with bone targeting instruments from [address] [place] [address] were  used to guide surgical instruments into the proximity of the subchondral  medial tibial plateau fracture. Specifically, the [employer]  AccuPort injection cannula was placed in the subchondral bone. Standard  repair methodology was used to treat the subchondral bone defect in the  medial tibial plateau. Image fluoroscopy was utilized to confirm  accurate insertion of the AccuPort injection cannula into the  subchondral bone. After insertion, fracture stabilization was performed  by injecting 4 mL of [employer] AccuFill bone substitute into  the medial tibial plateau.   Image fluoroscopy was used to monitor the  injection process and ensure injection of the bone substitute into the  subchondral bone, so that following polymerization the bone substitute  stabilized the fracture and facilitated fracture repair. The injection  wounds were closed and sterile dressing was applied.         Sarah Cintron MD    D: 10/04/2021 9:12:00       T: 10/04/2021 12:23:06     [email or web address]  Job#: [id number]     Doc#: [id number]    CC:

## 2021-10-04 NOTE — PROGRESS NOTES
Verbal and written discharge instructions given to patient and wife. Patient states pain is easing. And moving right foot upon command.

## 2021-10-04 NOTE — H&P
Update History & Physical    The patient's History and Physical  was reviewed with the patient and I examined the patient. There was no change. The surgical site was confirmed by the patient and me. Plan: The risks, benefits, expected outcome, and alternative to the recommended procedure have been discussed with the patient. Patient understands and wants to proceed with the procedure.      Electronically signed by Tevin Lang MD on 10/4/2021 at 7:39 AM

## 2021-10-04 NOTE — ANESTHESIA POSTPROCEDURE EVALUATION
Department of Anesthesiology  Postprocedure Note    Patient: Jessica Stevens  MRN: [id number]  YOB: 1950  Date of evaluation: 10/4/2021  Time:  10:10 AM     Procedure Summary     Date: 10/04/21 Room / Location: [address] [place] [address] / [facility]    Anesthesia Start: 2189 Anesthesia Stop: 3890    Procedure: RIGHT KNEE VIDEO ARTHROSCOPY, MEDIAL AND LATERAL MENISECTOMY, INTERNAL FIXATION MEDIAL TIBIAL PLATEAU INSUFFICIENCY FRACTURE WITH BONE SUBSTITUTE (Right Knee) Diagnosis: (RIGHT KNEE MEDIAL MENISCUS TEAR, LATERAL MENISCUS TEAR, INSUFFICIENCY FRACTURE MEDIAL TIBIAL PLATEAU)    Surgeons: Gold Griffin MD Responsible Provider: Shobha Dong MD    Anesthesia Type: General ASA Status: 2          Anesthesia Type: General    Cassius Phase I: Cassius Score: 10    Cassius Phase II: Cassius Score: 10    Last vitals: Reviewed and per EMR flowsheets. Anesthesia Post Evaluation    Patient location during evaluation: PACU  Patient participation: complete - patient participated  Level of consciousness: awake and alert  Airway patency: patent  Nausea & Vomiting: no nausea and no vomiting  Complications: no  Cardiovascular status: blood pressure returned to baseline  Respiratory status: acceptable  Hydration status: euvolemic  Comments: VSS on transfer to phase 2 recovery. No anesthetic complications.

## 2021-10-13 ENCOUNTER — OFFICE VISIT (OUTPATIENT)
Dept: ORTHOPEDIC SURGERY | Age: 71
End: 2021-10-13

## 2021-10-13 VITALS — HEIGHT: 61 IN | BODY MASS INDEX: 35.12 KG/M2 | WEIGHT: 186 LBS

## 2021-10-13 DIAGNOSIS — M23.203 DEGENERATIVE TEAR OF MEDIAL MENISCUS OF RIGHT KNEE: Primary | ICD-10-CM

## 2021-10-13 DIAGNOSIS — M84.469G INSUFFICIENCY FRACTURE OF TIBIA WITH DELAYED HEALING, SUBSEQUENT ENCOUNTER: ICD-10-CM

## 2021-10-13 DIAGNOSIS — M17.11 PRIMARY OSTEOARTHRITIS OF RIGHT KNEE: ICD-10-CM

## 2021-10-13 DIAGNOSIS — M23.300 DEGENERATIVE TEAR OF LATERAL MENISCUS OF RIGHT KNEE: ICD-10-CM

## 2021-10-13 PROCEDURE — 99024 POSTOP FOLLOW-UP VISIT: CPT | Performed by: ORTHOPAEDIC SURGERY

## 2021-10-13 NOTE — PROGRESS NOTES
FOLLOW-UP VISIT      The patient returns for follow-up s/p right knee video arthroscopy with partial medial lateral meniscectomy and subchondroplasty of the medial tibial plateau    Date of Surgery    10/4/2021    Wound Status    Sutures Removed, Incisions are healing well with no surrounding erythema, and minimal ecchymosis. Exam    She doing well with no signs of infection or DVT.   She is has grade 1-3 over 10 pain uses a cane for ambulation for balance and otherwise is happy with the result to date    Plan    Slow return to normal activity    Follow-up Appointment    4 weeks as needed

## 2021-11-10 ENCOUNTER — OFFICE VISIT (OUTPATIENT)
Dept: ORTHOPEDIC SURGERY | Age: 71
End: 2021-11-10

## 2021-11-10 VITALS — BODY MASS INDEX: 35.12 KG/M2 | WEIGHT: 186 LBS | HEIGHT: 61 IN

## 2021-11-10 DIAGNOSIS — M17.11 PRIMARY OSTEOARTHRITIS OF RIGHT KNEE: ICD-10-CM

## 2021-11-10 DIAGNOSIS — M23.203 DEGENERATIVE TEAR OF MEDIAL MENISCUS OF RIGHT KNEE: ICD-10-CM

## 2021-11-10 DIAGNOSIS — M84.469G INSUFFICIENCY FRACTURE OF TIBIA WITH DELAYED HEALING, SUBSEQUENT ENCOUNTER: Primary | ICD-10-CM

## 2021-11-10 PROCEDURE — 99024 POSTOP FOLLOW-UP VISIT: CPT | Performed by: ORTHOPAEDIC SURGERY

## 2021-11-10 NOTE — PROGRESS NOTES
FOLLOW-UP VISIT      The patient returns for follow-up s/p right knee video arthroscopy with partial medial lateral meniscectomy and subchondroplasty of the medial tibial plateau    Date of Surgery    10/4/2021    Wound Status    Sutures Removed, Incisions are healing well with no surrounding erythema, and minimal ecchymosis. Exam    She doing well with no signs of infection or DVT. She continues to improve and says she has less pain. I told her she can use some Voltaren gel over her knee for moderate relief. Otherwise she is satisfied with her result.     Plan    Slow return to normal activity    Follow-up Appointment    4 weeks as needed

## 2023-03-14 ENCOUNTER — HOSPITAL ENCOUNTER (OUTPATIENT)
Dept: WOMENS IMAGING | Age: 73
Discharge: HOME | End: 2023-03-14
Payer: MEDICARE

## 2023-03-14 DIAGNOSIS — R92.8 ABNORMAL MAMMOGRAM: ICD-10-CM

## 2023-03-14 DIAGNOSIS — C50.912 MALIGNANT NEOPLASM OF LEFT FEMALE BREAST, UNSPECIFIED ESTROGEN RECEPTOR STATUS, UNSPECIFIED SITE OF BREAST (HCC): ICD-10-CM

## 2023-03-14 PROCEDURE — 77065 DX MAMMO INCL CAD UNI: CPT

## 2023-03-14 PROCEDURE — 19285 PERQ DEV BREAST 1ST US IMAG: CPT

## 2023-03-14 NOTE — PROGRESS NOTES
Anesthesia Evaluation     Patient summary reviewed and Nursing notes reviewed      Airway   Mallampati: II  TM distance: >3 FB  Neck ROM: full  Dental    (+) edentulous    Pulmonary - normal exam   (+) asthma, recent URI resolved,   Cardiovascular     ECG reviewed    (+) hypertension, valvular problems/murmurs (Murmur), CAD, cardiac stents more than 12 months ago murmur,       Neuro/Psych- negative ROS  GI/Hepatic/Renal/Endo    (+) obesity,  chronic renal disease (History of kidney transplant) CRI, diabetes mellitus (Insulin Pump at 1.4 units/Hr) type 2 well controlled using insulin,     Musculoskeletal (-) negative ROS    Abdominal  - normal exam   Substance History - negative use     OB/GYN          Other   (+) blood dyscrasia (Pancytopenia)                               Anesthesia Plan    ASA 3     Anesthetic plan and risks discussed with patient.       Patient in [address] [place] [address] for breast RFID placement. Radiologist reviewed procedure with patient, consent signed. Patient tolerated procedure well. Compression held. Site cleansed with chloraprep, steri strips and dry dressing applied. Ice pack provided. Reviewed discharge instructions with patient. Patient verbalized understanding and agreed to contact the Breast Navigator with any questions. Patient was A&O x 3, steady on feet, and discharged to waiting area. The [facility]   Discharge Instructions  [facility]  [address] [facility]  Telephone: [phone]     Apply an ice pack for 15-20 minutes after your procedure for at least one hour. Leave the steri-strips and outer dressing on overnight. Remove outer dressing the following day but keep the steri strips on until they fall off. After showering, make sure you pat dry the area. You may return to work after the tag placement with the following restrictions: no lifting, pulling, or pushing anything over 5 lbs for the rest of the day. Please refrain from repetitive movement on the affected side for the rest of the day. Watch for signs of infection: swelling, pain fever, tenderness, heat or redness around the site. Do not soak in a hot tub, pool, or bath until the site has healed. The local anesthetic wears off about 3 hours after the procedure. You may use Tylenol for discomfort if needed. Bruising and tenderness are normal following the procedure. You may resume all stopped medications unless otherwise indicated by your Breast Surgeon. For any questions, please call the Nurse Navigator, Dayanna Rouse, for any issues at: [phone].      Thank you! -[facility]

## 2023-03-24 ENCOUNTER — ANESTHESIA EVENT (OUTPATIENT)
Dept: OPERATING ROOM | Age: 73
End: 2023-03-24
Payer: MEDICARE

## 2023-03-24 RX ORDER — ASPIRIN 81 MG/1
81 TABLET ORAL DAILY
COMMUNITY

## 2023-03-26 PROBLEM — Z17.0 MALIGNANT NEOPLASM OF OVERLAPPING SITES OF LEFT BREAST IN FEMALE, ESTROGEN RECEPTOR POSITIVE (HCC): Status: ACTIVE | Noted: 2023-03-26

## 2023-03-26 PROBLEM — C50.812 MALIGNANT NEOPLASM OF OVERLAPPING SITES OF LEFT BREAST IN FEMALE, ESTROGEN RECEPTOR POSITIVE (HCC): Status: ACTIVE | Noted: 2023-03-26

## 2023-03-27 ENCOUNTER — HOSPITAL ENCOUNTER (OUTPATIENT)
Age: 73
Setting detail: OUTPATIENT SURGERY
Discharge: HOME | End: 2023-03-27
Attending: SURGERY | Admitting: SURGERY
Payer: MEDICARE

## 2023-03-27 ENCOUNTER — HOSPITAL ENCOUNTER (OUTPATIENT)
Age: 73
Setting detail: SPECIMEN
Discharge: HOME | End: 2023-03-27
Attending: SURGERY
Payer: MEDICARE

## 2023-03-27 ENCOUNTER — ANESTHESIA (OUTPATIENT)
Dept: OPERATING ROOM | Age: 73
End: 2023-03-27
Payer: MEDICARE

## 2023-03-27 ENCOUNTER — HOSPITAL ENCOUNTER (OUTPATIENT)
Dept: NUCLEAR MEDICINE | Age: 73
Discharge: HOME | End: 2023-03-27
Attending: SURGERY
Payer: MEDICARE

## 2023-03-27 ENCOUNTER — APPOINTMENT (OUTPATIENT)
Dept: MAMMOGRAPHY | Age: 73
End: 2023-03-27
Attending: SURGERY
Payer: MEDICARE

## 2023-03-27 VITALS
HEART RATE: 65 BPM | RESPIRATION RATE: 16 BRPM | TEMPERATURE: 97.2 F | BODY MASS INDEX: 33.13 KG/M2 | HEIGHT: 62 IN | OXYGEN SATURATION: 100 % | SYSTOLIC BLOOD PRESSURE: 102 MMHG | WEIGHT: 180 LBS | DIASTOLIC BLOOD PRESSURE: 87 MMHG

## 2023-03-27 DIAGNOSIS — N63.25 BREAST LUMP ON LEFT SIDE AT 3 O'CLOCK POSITION: ICD-10-CM

## 2023-03-27 DIAGNOSIS — R92.8 ABNORMAL MAMMOGRAM: ICD-10-CM

## 2023-03-27 DIAGNOSIS — G89.18 ACUTE POST-OPERATIVE PAIN: Primary | ICD-10-CM

## 2023-03-27 DIAGNOSIS — Z17.0 MALIGNANT NEOPLASM OF LOWER-OUTER QUADRANT OF LEFT BREAST OF FEMALE, ESTROGEN RECEPTOR POSITIVE (HCC): ICD-10-CM

## 2023-03-27 DIAGNOSIS — C50.512 MALIGNANT NEOPLASM OF LOWER-OUTER QUADRANT OF LEFT BREAST OF FEMALE, ESTROGEN RECEPTOR POSITIVE (HCC): ICD-10-CM

## 2023-03-27 LAB
GLUCOSE BLD-MCNC: 147 MG/DL (ref 70–99)
GLUCOSE BLD-MCNC: 96 MG/DL (ref 70–99)
Lab: NORMAL
PERFORMED ON: ABNORMAL
PERFORMED ON: NORMAL
REPORT: NORMAL
THIS TEST SENT TO: NORMAL

## 2023-03-27 PROCEDURE — 3700000001 HC ADD 15 MINUTES (ANESTHESIA): Performed by: SURGERY

## 2023-03-27 PROCEDURE — 2709999900 HC NON-CHARGEABLE SUPPLY: Performed by: SURGERY

## 2023-03-27 PROCEDURE — 3600000004 HC SURGERY LEVEL 4 BASE: Performed by: SURGERY

## 2023-03-27 PROCEDURE — 88305 TISSUE EXAM BY PATHOLOGIST: CPT

## 2023-03-27 PROCEDURE — 78195 LYMPH SYSTEM IMAGING: CPT

## 2023-03-27 PROCEDURE — 6360000002 HC RX W HCPCS: Performed by: ANESTHESIOLOGY

## 2023-03-27 PROCEDURE — 3700000000 HC ANESTHESIA ATTENDED CARE: Performed by: SURGERY

## 2023-03-27 PROCEDURE — 7100000011 HC PHASE II RECOVERY - ADDTL 15 MIN: Performed by: SURGERY

## 2023-03-27 PROCEDURE — 6370000000 HC RX 637 (ALT 250 FOR IP): Performed by: ANESTHESIOLOGY

## 2023-03-27 PROCEDURE — 7100000010 HC PHASE II RECOVERY - FIRST 15 MIN: Performed by: SURGERY

## 2023-03-27 PROCEDURE — 88342 IMHCHEM/IMCYTCHM 1ST ANTB: CPT

## 2023-03-27 PROCEDURE — 2500000003 HC RX 250 WO HCPCS: Performed by: SURGERY

## 2023-03-27 PROCEDURE — 3600000014 HC SURGERY LEVEL 4 ADDTL 15MIN: Performed by: SURGERY

## 2023-03-27 PROCEDURE — 2580000003 HC RX 258: Performed by: ANESTHESIOLOGY

## 2023-03-27 PROCEDURE — 7100000001 HC PACU RECOVERY - ADDTL 15 MIN: Performed by: SURGERY

## 2023-03-27 PROCEDURE — A4217 STERILE WATER/SALINE, 500 ML: HCPCS | Performed by: SURGERY

## 2023-03-27 PROCEDURE — 3430000000 HC RX DIAGNOSTIC RADIOPHARMACEUTICAL: Performed by: SURGERY

## 2023-03-27 PROCEDURE — 88331 PATH CONSLTJ SURG 1 BLK 1SPC: CPT

## 2023-03-27 PROCEDURE — 2500000003 HC RX 250 WO HCPCS: Performed by: NURSE ANESTHETIST, CERTIFIED REGISTERED

## 2023-03-27 PROCEDURE — 6360000002 HC RX W HCPCS: Performed by: SURGERY

## 2023-03-27 PROCEDURE — 7100000000 HC PACU RECOVERY - FIRST 15 MIN: Performed by: SURGERY

## 2023-03-27 PROCEDURE — A9520 TC99 TILMANOCEPT DIAG 0.5MCI: HCPCS | Performed by: SURGERY

## 2023-03-27 PROCEDURE — 88307 TISSUE EXAM BY PATHOLOGIST: CPT

## 2023-03-27 PROCEDURE — 2720000010 HC SURG SUPPLY STERILE: Performed by: SURGERY

## 2023-03-27 PROCEDURE — 76098 X-RAY EXAM SURGICAL SPECIMEN: CPT

## 2023-03-27 PROCEDURE — 2580000003 HC RX 258: Performed by: SURGERY

## 2023-03-27 PROCEDURE — 6360000002 HC RX W HCPCS: Performed by: NURSE ANESTHETIST, CERTIFIED REGISTERED

## 2023-03-27 RX ORDER — PROCHLORPERAZINE EDISYLATE 5 MG/ML
5 INJECTION INTRAMUSCULAR; INTRAVENOUS
Status: DISCONTINUED | OUTPATIENT
Start: 2023-03-27 | End: 2023-03-27 | Stop reason: HOSPADM

## 2023-03-27 RX ORDER — ONDANSETRON 2 MG/ML
INJECTION INTRAMUSCULAR; INTRAVENOUS PRN
Status: DISCONTINUED | OUTPATIENT
Start: 2023-03-27 | End: 2023-03-27 | Stop reason: SDUPTHER

## 2023-03-27 RX ORDER — SODIUM CHLORIDE 0.9 % (FLUSH) 0.9 %
5-40 SYRINGE (ML) INJECTION EVERY 12 HOURS SCHEDULED
Status: DISCONTINUED | OUTPATIENT
Start: 2023-03-27 | End: 2023-03-27 | Stop reason: HOSPADM

## 2023-03-27 RX ORDER — ONDANSETRON 2 MG/ML
4 INJECTION INTRAMUSCULAR; INTRAVENOUS
Status: DISCONTINUED | OUTPATIENT
Start: 2023-03-27 | End: 2023-03-27 | Stop reason: HOSPADM

## 2023-03-27 RX ORDER — PHENYLEPHRINE HYDROCHLORIDE 10 MG/ML
INJECTION INTRAVENOUS PRN
Status: DISCONTINUED | OUTPATIENT
Start: 2023-03-27 | End: 2023-03-27 | Stop reason: SDUPTHER

## 2023-03-27 RX ORDER — SODIUM CHLORIDE, SODIUM LACTATE, POTASSIUM CHLORIDE, CALCIUM CHLORIDE 600; 310; 30; 20 MG/100ML; MG/100ML; MG/100ML; MG/100ML
INJECTION, SOLUTION INTRAVENOUS CONTINUOUS
Status: DISCONTINUED | OUTPATIENT
Start: 2023-03-27 | End: 2023-03-27 | Stop reason: HOSPADM

## 2023-03-27 RX ORDER — HYDRALAZINE HYDROCHLORIDE 20 MG/ML
10 INJECTION INTRAMUSCULAR; INTRAVENOUS
Status: DISCONTINUED | OUTPATIENT
Start: 2023-03-27 | End: 2023-03-27 | Stop reason: HOSPADM

## 2023-03-27 RX ORDER — GLYCOPYRROLATE 0.2 MG/ML
INJECTION INTRAMUSCULAR; INTRAVENOUS PRN
Status: DISCONTINUED | OUTPATIENT
Start: 2023-03-27 | End: 2023-03-27 | Stop reason: SDUPTHER

## 2023-03-27 RX ORDER — APREPITANT 40 MG/1
40 CAPSULE ORAL ONCE
Status: COMPLETED | OUTPATIENT
Start: 2023-03-27 | End: 2023-03-27

## 2023-03-27 RX ORDER — SCOLOPAMINE TRANSDERMAL SYSTEM 1 MG/1
1 PATCH, EXTENDED RELEASE TRANSDERMAL ONCE
Status: DISCONTINUED | OUTPATIENT
Start: 2023-03-27 | End: 2023-03-27 | Stop reason: HOSPADM

## 2023-03-27 RX ORDER — MEPERIDINE HYDROCHLORIDE 25 MG/ML
12.5 INJECTION INTRAMUSCULAR; INTRAVENOUS; SUBCUTANEOUS EVERY 5 MIN PRN
Status: DISCONTINUED | OUTPATIENT
Start: 2023-03-27 | End: 2023-03-27 | Stop reason: HOSPADM

## 2023-03-27 RX ORDER — HYDROMORPHONE HCL 110MG/55ML
PATIENT CONTROLLED ANALGESIA SYRINGE INTRAVENOUS PRN
Status: DISCONTINUED | OUTPATIENT
Start: 2023-03-27 | End: 2023-03-27 | Stop reason: SDUPTHER

## 2023-03-27 RX ORDER — PROPOFOL 10 MG/ML
INJECTION, EMULSION INTRAVENOUS PRN
Status: DISCONTINUED | OUTPATIENT
Start: 2023-03-27 | End: 2023-03-27 | Stop reason: SDUPTHER

## 2023-03-27 RX ORDER — OXYCODONE HYDROCHLORIDE 5 MG/1
5 TABLET ORAL
Status: DISCONTINUED | OUTPATIENT
Start: 2023-03-27 | End: 2023-03-27 | Stop reason: HOSPADM

## 2023-03-27 RX ORDER — ISOSULFAN BLUE 50 MG/5ML
INJECTION, SOLUTION SUBCUTANEOUS PRN
Status: DISCONTINUED | OUTPATIENT
Start: 2023-03-27 | End: 2023-03-27 | Stop reason: HOSPADM

## 2023-03-27 RX ORDER — LABETALOL HYDROCHLORIDE 5 MG/ML
10 INJECTION, SOLUTION INTRAVENOUS
Status: DISCONTINUED | OUTPATIENT
Start: 2023-03-27 | End: 2023-03-27 | Stop reason: HOSPADM

## 2023-03-27 RX ORDER — MAGNESIUM HYDROXIDE 1200 MG/15ML
LIQUID ORAL CONTINUOUS PRN
Status: DISCONTINUED | OUTPATIENT
Start: 2023-03-27 | End: 2023-03-27 | Stop reason: HOSPADM

## 2023-03-27 RX ORDER — SODIUM CHLORIDE 9 MG/ML
INJECTION, SOLUTION INTRAVENOUS PRN
Status: DISCONTINUED | OUTPATIENT
Start: 2023-03-27 | End: 2023-03-27 | Stop reason: HOSPADM

## 2023-03-27 RX ORDER — KETAMINE HCL IN NACL, ISO-OSM 20 MG/2 ML
SYRINGE (ML) INJECTION PRN
Status: DISCONTINUED | OUTPATIENT
Start: 2023-03-27 | End: 2023-03-27 | Stop reason: SDUPTHER

## 2023-03-27 RX ORDER — HYDROCODONE BITARTRATE AND ACETAMINOPHEN 5; 325 MG/1; MG/1
1 TABLET ORAL EVERY 6 HOURS PRN
Qty: 20 TABLET | Refills: 0 | Status: SHIPPED | OUTPATIENT
Start: 2023-03-27 | End: 2023-04-01

## 2023-03-27 RX ORDER — BUPIVACAINE HYDROCHLORIDE 5 MG/ML
INJECTION, SOLUTION EPIDURAL; INTRACAUDAL PRN
Status: DISCONTINUED | OUTPATIENT
Start: 2023-03-27 | End: 2023-03-27 | Stop reason: HOSPADM

## 2023-03-27 RX ORDER — FAMOTIDINE 10 MG/ML
INJECTION, SOLUTION INTRAVENOUS PRN
Status: DISCONTINUED | OUTPATIENT
Start: 2023-03-27 | End: 2023-03-27 | Stop reason: SDUPTHER

## 2023-03-27 RX ORDER — SODIUM CHLORIDE 0.9 % (FLUSH) 0.9 %
5-40 SYRINGE (ML) INJECTION PRN
Status: DISCONTINUED | OUTPATIENT
Start: 2023-03-27 | End: 2023-03-27 | Stop reason: HOSPADM

## 2023-03-27 RX ORDER — LIDOCAINE HYDROCHLORIDE 20 MG/ML
INJECTION, SOLUTION INTRAVENOUS PRN
Status: DISCONTINUED | OUTPATIENT
Start: 2023-03-27 | End: 2023-03-27 | Stop reason: SDUPTHER

## 2023-03-27 RX ADMIN — HYDROMORPHONE HYDROCHLORIDE 1 MG: 2 INJECTION, SOLUTION INTRAMUSCULAR; INTRAVENOUS; SUBCUTANEOUS at 07:36

## 2023-03-27 RX ADMIN — PHENYLEPHRINE HYDROCHLORIDE 100 MCG: 10 INJECTION INTRAVENOUS at 07:39

## 2023-03-27 RX ADMIN — Medication 20 MG: at 08:53

## 2023-03-27 RX ADMIN — GLYCOPYRROLATE 0.2 MG: 0.2 INJECTION INTRAMUSCULAR; INTRAVENOUS at 07:39

## 2023-03-27 RX ADMIN — ONDANSETRON 8 MG: 2 INJECTION INTRAMUSCULAR; INTRAVENOUS at 07:32

## 2023-03-27 RX ADMIN — APREPITANT 40 MG: 40 CAPSULE ORAL at 07:25

## 2023-03-27 RX ADMIN — TILMANOCEPT 0.8 MILLICURIE: KIT at 07:55

## 2023-03-27 RX ADMIN — LIDOCAINE HYDROCHLORIDE 100 MG: 20 INJECTION, SOLUTION INTRAVENOUS at 07:36

## 2023-03-27 RX ADMIN — SODIUM CHLORIDE, POTASSIUM CHLORIDE, SODIUM LACTATE AND CALCIUM CHLORIDE: 600; 310; 30; 20 INJECTION, SOLUTION INTRAVENOUS at 09:11

## 2023-03-27 RX ADMIN — FAMOTIDINE 20 MG: 10 INJECTION, SOLUTION INTRAVENOUS at 07:32

## 2023-03-27 RX ADMIN — PROPOFOL 130 MG: 10 INJECTION, EMULSION INTRAVENOUS at 07:36

## 2023-03-27 RX ADMIN — CEFAZOLIN 2000 MG: 2 INJECTION, POWDER, FOR SOLUTION INTRAMUSCULAR; INTRAVENOUS at 07:39

## 2023-03-27 RX ADMIN — SODIUM CHLORIDE, POTASSIUM CHLORIDE, SODIUM LACTATE AND CALCIUM CHLORIDE: 600; 310; 30; 20 INJECTION, SOLUTION INTRAVENOUS at 06:28

## 2023-03-27 ASSESSMENT — PAIN DESCRIPTION - LOCATION
LOCATION: BREAST
LOCATION: OTHER (COMMENT)
LOCATION: BREAST

## 2023-03-27 ASSESSMENT — PAIN - FUNCTIONAL ASSESSMENT
PAIN_FUNCTIONAL_ASSESSMENT: PREVENTS OR INTERFERES WITH MANY ACTIVE NOT PASSIVE ACTIVITIES
PAIN_FUNCTIONAL_ASSESSMENT: 0-10

## 2023-03-27 ASSESSMENT — PAIN DESCRIPTION - DESCRIPTORS
DESCRIPTORS: DISCOMFORT
DESCRIPTORS: TENDER
DESCRIPTORS: DISCOMFORT

## 2023-03-27 ASSESSMENT — PAIN DESCRIPTION - PAIN TYPE
TYPE: SURGICAL PAIN

## 2023-03-27 ASSESSMENT — PAIN DESCRIPTION - ORIENTATION
ORIENTATION: LEFT
ORIENTATION: LEFT;LOWER
ORIENTATION: LEFT

## 2023-03-27 ASSESSMENT — PAIN DESCRIPTION - FREQUENCY
FREQUENCY: CONTINUOUS

## 2023-03-27 ASSESSMENT — PAIN SCALES - GENERAL
PAINLEVEL_OUTOF10: 0
PAINLEVEL_OUTOF10: 3

## 2023-03-27 ASSESSMENT — PAIN DESCRIPTION - ONSET
ONSET: ON-GOING
ONSET: ON-GOING

## 2023-03-27 NOTE — OP NOTE
created an inframammary incision and extended the incision through the subcutaneous tissues. We elevated a flap over the area of malignancy. Once we created the flap, we excised the area identified by the RFID tag. The dissection was taken to the level of the pectoralis muscle. Specimen radiograph demonstrated the RFID tag and biopsy marker eccentrically located towards the cranial margin. This margin was reexcised with the true margin identified by double suture. We placed clips in the perimeter of the cavity. The surgical site was irrigated and inspected for hemostasis. We closed with interrupted subcutaneous sutures followed by 3-0 Vicryl deep dermal closure and a running 4-0 Monocryl subcuticular skin closure. Both incisions were sealed with Dermabond. A chlorhexidine patch was placed around the drain. The patient was taken to recovery in satisfactory condition having tolerated procedure well.       Electronically signed by Romeo Austin MD on 3/27/2023 at 6:16 PM

## 2023-03-27 NOTE — ANESTHESIA PRE PROCEDURE
GI/Hepatic/Renal:   (+) GERD:,           Endo/Other:    (+) DiabetesType II DM, , .                 Abdominal:             Vascular: negative vascular ROS. Other Findings:           Anesthesia Plan      general     ASA 3       Induction: intravenous. MIPS: Postoperative opioids intended and Prophylactic antiemetics administered. Anesthetic plan and risks discussed with patient. Plan discussed with CRNA.     Attending anesthesiologist reviewed and agrees with Preprocedure content                Nnamdi Gonzalez MD   3/27/2023

## 2023-03-27 NOTE — ANESTHESIA POSTPROCEDURE EVALUATION
Department of Anesthesiology  Postprocedure Note    Patient: Radha Mancuso  MRN: [id number]  YOB: 1950  Date of evaluation: 3/27/2023      Procedure Summary     Date: 03/27/23 Room / Location: [facility]    Anesthesia Start: 0730 Anesthesia Stop: 2455    Procedures:       LUMPECTOMY LEFT BREAST, LEFT BREAST SENTINEL NODE BIOPSY, LEFT POSSIBLE AXILLARY NODE BIOPSY, INJECTION OF LYMPHAZURIN AND LYMPHOSEEK,  IMAGE GUIDED MODALITY ; ULTRASOUND LATERALITY LEFT BREAST 1 RADIOFREQUENCY TAG, 6 O'CLOCK AND FTN: 5CM TARGET MASS (3.14.23) 12:30PM (Left: Breast)      . (Left: Breast) Diagnosis:       Malignant neoplasm of lower-outer quadrant of left breast of female, estrogen receptor positive (HCC)      Breast lump on left side at 3 o'clock position      (Malignant neoplasm of lower-outer quadrant of left breast of female, estrogen receptor positive ([address] [C50.512, Z17.0] Mass Over Lapping Multiple Quadrants Of Left Breast M63.26)    Surgeons: Belkys Nunez MD Responsible Provider: Arden Brito MD    Anesthesia Type: general ASA Status: 3          Anesthesia Type: No value filed.     Cassius Phase I: Cassius Score: 8    Cassius Phase II:        Anesthesia Post Evaluation    Patient location during evaluation: PACU  Patient participation: complete - patient participated  Level of consciousness: awake and alert  Airway patency: patent  Nausea & Vomiting: no nausea and no vomiting  Complications: no  Cardiovascular status: hemodynamically stable  Respiratory status: acceptable  Hydration status: euvolemic  Multimodal analgesia pain management approach

## 2023-03-27 NOTE — DISCHARGE INSTRUCTIONS
you, either with soap and water or an alcohol-based hand rub. IF YOU DO NOT SEE YOUR PROVIDERS CLEAN THEIR HANDS, PLEASE ASK THEM TO DO SO. Family and friends who visit you should not touch the surgical wound or dressings. Family and friends should clean their hands with soap and water or an alcohol-based hand rub before and after visiting you. If you do not see them clean their hands, ask them to clean their hands. What do I need to do when I go home from the hospital?  Before you go home, your doctor nurses should explain everything you need to know about taking care of your wound. Make sure you understand how to care for your wound before you leave the hospital.    Always clean your hands before and after caring for your wound. Before you go home, make sure you know who to contact if you have questions or problems after you get home. If you have any symptoms of an infection, such as redness and pain at the surgery site, drainage, or fever, call your doctor immediately. If you have additional questions, please ask your doctor or nurse. Thank you for allowing us to care for you. We hope we have exceeded your expectations and provided a very good overall experience while taking care of you and your family. If you have additional questions, please ask your doctor or nurse. Thank you for allowing us to care for you. We hope we have exceeded your expectations and provided a very good overall experience while taking care of you and your family.

## 2023-03-27 NOTE — PROGRESS NOTES
Ambulatory Surgery/Procedure Discharge Note    Vitals:    03/27/23 1110   BP: 102/87   Pulse: 65   Resp: 16   Temp: 97.2 °F (36.2 °C)   SpO2: 100%       In: 1575 [P.O.:150; I.V.:1375]  Out: 5     Restroom use offered before discharge. Yes    Pain assessment:  present - adequately treated  Pain Level: 3    RACHANA with 5 ml and emptied of watery dark red . RACHANA is compressed. Left breast incision (inferior to axilla) is well approximated without drainage. Discharge instructions reviewed. Patient and daughter and spouse educated, using the teach back method, about follow up instructions and discharge instructions. A completed copy of the AVS instructions given to patient and all questions answered. Demo of how to empty RACHANA and to Barnetta Caul  given to spouse and daughter. RACHANA pinned to most inferior aspect of bra. Family assisted her with dressing. Has had fluids without nausea. Patient discharged to home/self care.  Patient discharged via wheel chair by me to waiting family/S.O.       3/27/2023 12:44 PM
PACU Transfer to [facility]    Procedure(s):  LUMPECTOMY LEFT BREAST, LEFT BREAST SENTINEL NODE BIOPSY, LEFT POSSIBLE AXILLARY NODE BIOPSY, INJECTION OF LYMPHAZURIN AND LYMPHOSEEK,  IMAGE GUIDED MODALITY ; ULTRASOUND LATERALITY LEFT BREAST 1 RADIOFREQUENCY TAG, 6 O'CLOCK AND FTN: 5CM TARGET MASS (3.14.23) 12:30PM  .    Pt's Current Allergies: Latex, Metformin and related, Neosporin [neomycin-polymyxin-gramicidin], Cashew nut oil, and Food    Pt meets criteria to transfer to next phase of care per LATONYA SCORE and ASPAN standards    Recent Labs     03/27/23  0626 03/27/23  0927   POCGLU 147* 96       Vitals:    03/27/23 1045   BP: (!) 110/54   Pulse: 67   Resp: 19   Temp: 97.1 °F (36.2 °C)   SpO2: 95%      BP within 20% of pt's admitting BP as per Latonya Score      Intake/Output Summary (Last 24 hours) at 3/27/2023 1057  Last data filed at 3/27/2023 1045  Gross per 24 hour   Intake 1425 ml   Output 0 ml   Net 1425 ml       Pain assessment:  present - adequately treated  Pain Level: 3    Patient was assessed for unknown alterations to skin integrity. There were not unknown alterations observed. Out-patient pharmacy aware that patient will be in same day when prescription is ready. Family in main lobby. No further changes. Patient denied nausea in PACU and tolerated a few ice chips. Scope patch behind right ear. RACHANA supplies on chart to go home with patient. Patient transferred to care of Sebastian Jacobs RN.    Family updated and directed to Sebastian Jacobs    3/27/2023 10:57 AM
Patient admitted to PACU #9 from OR per stretcher at 0927 s/p LUMPECTOMY LEFT BREAST, LEFT BREAST SENTINEL NODE BIOPSY, LEFT POSSIBLE AXILLARY NODE BIOPSY, INJECTION OF LYMPHAZURIN AND LYMPHOSEEK, IMAGE GUIDED MODALITY ; ULTRASOUND LATERALITY LEFT BREAST 1 RADIOFREQUENCY TAG, 6 O'CLOCK AND FTN: 5CM TARGET MASS (3.14.23) 12:30PM - Left. Report received at bedside in PACU per CRNA and OR nurse. Patient was reported to be hemodynamically stable in OR with no complications. Patient connected to PACU monitoring equipment. IVF's infusing with site unremarkable. Patient arrived to PACU responsive but not fully wakeful from anesthesia but with respirations easy, even and regular with no pain noted or verbalized. Left axilla and Left breast (underneath) surgical dressings with surgical glue both remain C,D,I with no drainage noted with incisions well approximated. No pain noted or verbalized. No further changes. Will continue to monitor.   
Personally updated patient's family who are in the main lobby at [phone] on patient's status as well as needing to  pain medication in out-patient pharmacy when medication is ready.
[facility] PRE-SURGICAL TESTING INSTRUCTIONS                      PRIOR TO PROCEDURE DATE:    1. PLEASE FOLLOW ANY INSTRUCTIONS GIVEN TO YOU PER YOUR SURGEON. 2. Arrange for someone to drive you home and be with you for the first 24 hours after discharge for your safety after your procedure for which you received sedation. Ensure it is someone we can share information with regarding your discharge. NOTE: At this time ONLY 2 ADULTS may accompany you   One person ENCOURAGED to stay at hospital entire time if outpatient surgery      3. You must contact your surgeon for instructions IF:  You are taking any blood thinners, aspirin, anti-inflammatory or vitamins. There is a change in your physical condition such as a cold, fever, rash, cuts, sores, or any other infection, especially near your surgical site. 4. Do not drink alcohol the day before or day of your procedure. Do not use any recreational marijuana at least 24 hours or street drugs (heroin, cocaine) at minimum 5 days prior to your procedure. 5. A Pre-Surgical History and Physical MUST be completed WITHIN 30 DAYS OR LESS prior to your procedure. by your Physician or an Urgent Care        THE DAY OF YOUR PROCEDURE:  1. Follow instructions for ARRIVAL TIME as DIRECTED BY YOUR SURGEON. 2. Enter the MAIN entrance from [employer] and follow the signs to the free [employer]ing [employer] or [employer] (offered free of charge 7 am-5pm). 3. Enter the Main Entrance of the hospital (do not enter from the lower level of the parking garage). Upon entrance, check in with the  at the surgical information desk on your LEFT. Bring your insurance card and photo ID to register      4. DO NOT EAT ANYTHING 8 hours prior to arrival for surgery. You may have up to 8 ounces of water 4 hours prior to your arrival for surgery.    NOTE: ALL Gastric, Bariatric & Bowel surgery patients - you MUST follow your surgeon's instructions regarding
to Signature                                    Printed Name                    Date / Time    If patient is unable to sign or is a minor, complete the following)  Patient is a minor, ____ years of age, or unable to sign because:   ______________________________________________________________________________________________    If a phone consent is obtained, consent will be documented by using two health care professionals, each affirming that the consenting party has no questions and gives consent for the procedure discussed with the physician/provider.   _____________________          ____________________       _____/_____am/pm   2nd witness to phone consent        Printed name           Date / Time    Informed Consent:  I have provided the explanation described above in section 1 to the patient and/or legal representative.  I have provided the patient and/or legal representative with an opportunity to ask any questions about the proposed operation/procedure.   ___________________________          ____________________         ____/____am/pm  Provider / Proceduralist                            Printed name            Date / Time  Revised 8/2/2021                                                                      Page 2 of 2

## 2023-03-27 NOTE — BRIEF OP NOTE
Brief Postoperative Note      Patient: Indio Espinal  YOB: 1950  MRN: [id number]    Date of Procedure: 3/27/2023    Pre-Op Diagnosis: Malignant neoplasm of lower-outer quadrant of left breast of female, estrogen receptor positive ([facility] [address] [C50.512, Z17.0] Mass Over Lapping Multiple Quadrants Of Left Breast M63.26    Post-Op Diagnosis: Same       Procedure(s):  LUMPECTOMY LEFT BREAST, LEFT BREAST SENTINEL NODE BIOPSY, LEFT POSSIBLE AXILLARY NODE BIOPSY, INJECTION OF LYMPHAZURIN AND LYMPHOSEEK,  IMAGE GUIDED MODALITY ; ULTRASOUND LATERALITY LEFT BREAST 1 RADIOFREQUENCY TAG, 6 O'CLOCK AND FTN: 5CM TARGET MASS (3.14.23) 12:30PM  .    Surgeon(s):  Peter Heard MD    Assistant:  Surgical Assistant: Ashli Mancia    Anesthesia: General    Estimated Blood Loss (mL): less than 50     Complications: None    Specimens:   ID Type Source Tests Collected by Time Destination   A : SENTINEL NODE LEFT AXILLA Tissue Tissue SURGICAL PATHOLOGY Peter Heard MD 3/27/2023 0745    B : SENTINEL NODE #2 LEFT AXILLA Tissue Tissue SURGICAL PATHOLOGY Peter Heard MD 3/27/2023 3939    C : LEFT BREAST MASS Tissue Tissue SURGICAL PATHOLOGY Peter Heard MD 3/27/2023 7416    D : SUPERIOR MARGIN LEFT LUMPECTOMY Tissue Tissue SURGICAL PATHOLOGY Peter Heard MD 3/27/2023 8439        Implants:  * No implants in log *      Drains:   Closed/Suction Drain Left Breast Bulb (Active)       Findings: sentinel node #1 negative for malignancy, RFID tag/biopsy clip    Electronically signed by Peter Heard MD on 3/27/2023 at 10:10 AM

## 2023-03-27 NOTE — H&P
The patient was identified and examined. The surgical site was marked with RFID tag localization. No interval changes in health status since H&P was performed. The patient is cleared to proceed as scheduled.

## 2023-03-28 ENCOUNTER — HOSPITAL ENCOUNTER (OUTPATIENT)
Dept: WOMENS IMAGING | Age: 73
Discharge: HOME | End: 2023-03-28

## 2023-03-28 DIAGNOSIS — C50.112 MALIGNANT NEOPLASM OF CENTRAL PORTION OF LEFT FEMALE BREAST, UNSPECIFIED ESTROGEN RECEPTOR STATUS (HCC): ICD-10-CM

## 2023-08-16 ENCOUNTER — HOSPITAL ENCOUNTER (OUTPATIENT)
Dept: GENERAL RADIOLOGY | Age: 73
Discharge: HOME | End: 2023-08-16
Payer: MEDICARE

## 2023-08-16 DIAGNOSIS — C50.121 MALIGNANT NEOPLASM OF CENTRAL PORTION OF RIGHT MALE BREAST, UNSPECIFIED ESTROGEN RECEPTOR STATUS (HCC): ICD-10-CM

## 2023-08-16 DIAGNOSIS — N95.9 UNSPECIFIED MENOPAUSAL AND PERIMENOPAUSAL DISORDER: ICD-10-CM

## 2023-08-16 PROCEDURE — 77080 DXA BONE DENSITY AXIAL: CPT

## 2023-11-27 ENCOUNTER — OFFICE VISIT (OUTPATIENT)
Dept: ORTHOPEDIC SURGERY | Age: 73
End: 2023-11-27
Payer: MEDICARE

## 2023-11-27 VITALS — HEIGHT: 61 IN | BODY MASS INDEX: 33.99 KG/M2 | WEIGHT: 180 LBS

## 2023-11-27 DIAGNOSIS — M17.12 LOCALIZED OSTEOARTHRITIS OF LEFT KNEE: ICD-10-CM

## 2023-11-27 DIAGNOSIS — M25.562 ACUTE PAIN OF LEFT KNEE: Primary | ICD-10-CM

## 2023-11-27 PROCEDURE — 1090F PRES/ABSN URINE INCON ASSESS: CPT | Performed by: PHYSICIAN ASSISTANT

## 2023-11-27 PROCEDURE — G8399 PT W/DXA RESULTS DOCUMENT: HCPCS | Performed by: PHYSICIAN ASSISTANT

## 2023-11-27 PROCEDURE — 99213 OFFICE O/P EST LOW 20 MIN: CPT | Performed by: PHYSICIAN ASSISTANT

## 2023-11-27 PROCEDURE — 1123F ACP DISCUSS/DSCN MKR DOCD: CPT | Performed by: PHYSICIAN ASSISTANT

## 2023-11-27 PROCEDURE — G8484 FLU IMMUNIZE NO ADMIN: HCPCS | Performed by: PHYSICIAN ASSISTANT

## 2023-11-27 PROCEDURE — 3017F COLORECTAL CA SCREEN DOC REV: CPT | Performed by: PHYSICIAN ASSISTANT

## 2023-11-27 PROCEDURE — G8419 CALC BMI OUT NRM PARAM NOF/U: HCPCS | Performed by: PHYSICIAN ASSISTANT

## 2023-11-27 PROCEDURE — G8427 DOCREV CUR MEDS BY ELIG CLIN: HCPCS | Performed by: PHYSICIAN ASSISTANT

## 2023-11-27 PROCEDURE — 1036F TOBACCO NON-USER: CPT | Performed by: PHYSICIAN ASSISTANT

## 2023-11-27 RX ORDER — MELOXICAM 15 MG/1
15 TABLET ORAL DAILY PRN
Qty: 90 TABLET | Refills: 0 | Status: SHIPPED | OUTPATIENT
Start: 2023-11-27

## 2023-11-27 NOTE — PROGRESS NOTES
Dr Kiran Wilson      Date /Time 11/27/2023       5:04 PM EST  Name Augusto Saba             1950   Location  [facility]  MRN [id number]                Chief Complaint   Patient presents with    Knee Pain     [facility] Left Knee        History of Present Illness  Augusto Saba is a 74 y.o. female who presents with  left knee pain, . Sent in consultation by Renny Veliz DO, . Injury Mechanism:  fall. Worker's Comp. & legal issues:   none. Previous Treatments: Ice, Heat, and NSAIDs    Patient presents to the after-hours clinic with a new problem. Patient here with a chief complaint of left knee pain. Patient did have a fall in mid November but her pain did not start until 3 days ago. .  She states pain concentrated medial and posterior. Increased pain with activities and improvement with rest.  She is a diabetic. She does take a baby aspirin and fish oil    Past History  Past Medical History:   Diagnosis Date    Acid reflux     Arthritis     Diabetes mellitus (HCC)     Heart murmur     Hyperlipidemia     Hypertension     Kidney stone     PONV (postoperative nausea and vomiting)     Thyroid cancer ([address])     S/P thyroidectomy     Past Surgical History:   Procedure Laterality Date    BREAST BIOPSY Left 3/27/2023    LUMPECTOMY LEFT BREAST, LEFT BREAST SENTINEL NODE BIOPSY, LEFT POSSIBLE AXILLARY NODE BIOPSY, INJECTION OF LYMPHAZURIN AND LYMPHOSEEK,  IMAGE GUIDED MODALITY ; ULTRASOUND LATERALITY LEFT BREAST 1 RADIOFREQUENCY TAG, 6 O'CLOCK AND FTN: 5CM TARGET MASS (3.14.23) 12:30PM performed by Rolan Garcia MD at [facility] Left 3/27/2023    .  performed by Rolan Garcia MD at [address] [place] [address]      nose    KNEE ARTHROSCOPY Right 10/04/2021    RIGHT KNEE VIDEO ARTHROSCOPY, MEDIAL AND LATERAL MENISECTOMY, INTERNAL FIXATION MEDIAL TIBIAL PLATEAU INSUFFICIENCY FRACTURE WITH BONE SUBSTITUTE performed by Spenser Garcia MD at

## 2023-12-11 ENCOUNTER — OFFICE VISIT (OUTPATIENT)
Dept: ORTHOPEDIC SURGERY | Age: 73
End: 2023-12-11
Payer: MEDICARE

## 2023-12-11 VITALS — HEIGHT: 61 IN | WEIGHT: 180 LBS | BODY MASS INDEX: 33.99 KG/M2

## 2023-12-11 DIAGNOSIS — M22.42 CHONDROMALACIA OF BOTH PATELLAE: ICD-10-CM

## 2023-12-11 DIAGNOSIS — M22.41 CHONDROMALACIA OF BOTH PATELLAE: ICD-10-CM

## 2023-12-11 DIAGNOSIS — M17.0 BILATERAL PRIMARY OSTEOARTHRITIS OF KNEE: Primary | ICD-10-CM

## 2023-12-11 DIAGNOSIS — S80.02XA CONTUSION OF LEFT KNEE, INITIAL ENCOUNTER: ICD-10-CM

## 2023-12-11 DIAGNOSIS — M25.562 ACUTE PAIN OF BOTH KNEES: ICD-10-CM

## 2023-12-11 DIAGNOSIS — M25.561 ACUTE PAIN OF BOTH KNEES: ICD-10-CM

## 2023-12-11 PROCEDURE — G8417 CALC BMI ABV UP PARAM F/U: HCPCS | Performed by: FAMILY MEDICINE

## 2023-12-11 PROCEDURE — 20610 DRAIN/INJ JOINT/BURSA W/O US: CPT | Performed by: FAMILY MEDICINE

## 2023-12-11 PROCEDURE — 1036F TOBACCO NON-USER: CPT | Performed by: FAMILY MEDICINE

## 2023-12-11 PROCEDURE — 99203 OFFICE O/P NEW LOW 30 MIN: CPT | Performed by: FAMILY MEDICINE

## 2023-12-11 PROCEDURE — 1090F PRES/ABSN URINE INCON ASSESS: CPT | Performed by: FAMILY MEDICINE

## 2023-12-11 PROCEDURE — G8399 PT W/DXA RESULTS DOCUMENT: HCPCS | Performed by: FAMILY MEDICINE

## 2023-12-11 PROCEDURE — 3017F COLORECTAL CA SCREEN DOC REV: CPT | Performed by: FAMILY MEDICINE

## 2023-12-11 PROCEDURE — G8427 DOCREV CUR MEDS BY ELIG CLIN: HCPCS | Performed by: FAMILY MEDICINE

## 2023-12-11 PROCEDURE — G8484 FLU IMMUNIZE NO ADMIN: HCPCS | Performed by: FAMILY MEDICINE

## 2023-12-11 PROCEDURE — 1123F ACP DISCUSS/DSCN MKR DOCD: CPT | Performed by: FAMILY MEDICINE

## 2023-12-11 RX ORDER — BETAMETHASONE SODIUM PHOSPHATE AND BETAMETHASONE ACETATE 3; 3 MG/ML; MG/ML
12 INJECTION, SUSPENSION INTRA-ARTICULAR; INTRALESIONAL; INTRAMUSCULAR; SOFT TISSUE ONCE
Status: COMPLETED | OUTPATIENT
Start: 2023-12-11 | End: 2023-12-11

## 2023-12-11 RX ORDER — LIDOCAINE HYDROCHLORIDE 10 MG/ML
1 INJECTION, SOLUTION INFILTRATION; PERINEURAL ONCE
Status: COMPLETED | OUTPATIENT
Start: 2023-12-11 | End: 2023-12-11

## 2023-12-11 RX ORDER — BUPIVACAINE HYDROCHLORIDE 2.5 MG/ML
2 INJECTION, SOLUTION INFILTRATION; PERINEURAL ONCE
Status: COMPLETED | OUTPATIENT
Start: 2023-12-11 | End: 2023-12-11

## 2023-12-11 RX ADMIN — BETAMETHASONE SODIUM PHOSPHATE AND BETAMETHASONE ACETATE 12 MG: 3; 3 INJECTION, SUSPENSION INTRA-ARTICULAR; INTRALESIONAL; INTRAMUSCULAR; SOFT TISSUE at 14:17

## 2023-12-11 RX ADMIN — BUPIVACAINE HYDROCHLORIDE 5 MG: 2.5 INJECTION, SOLUTION INFILTRATION; PERINEURAL at 14:18

## 2023-12-11 RX ADMIN — LIDOCAINE HYDROCHLORIDE 1 ML: 10 INJECTION, SOLUTION INFILTRATION; PERINEURAL at 14:17

## 2023-12-11 NOTE — PROGRESS NOTES
too long she has a 4-5 out of 10. She states that she had breast cancer back in February and is on anastrozole 1 mg daily. She states her sugar typically goes into the 300s for 3 days after a single cortisone shot. She is being seen today for orthopedic and sports consultation with review of her recent imaging. Pain Assessment  Location of Pain: Knee  Location Modifiers: Left  Severity of Pain: 4  Quality of Pain: Aching  Duration of Pain: Persistent  Frequency of Pain: Constant  Aggravating Factors: Walking, Standing, Squatting  Limiting Behavior: Yes  Relieving Factors: Rest  Result of Injury: Yes  Work-Related Injury: No  Are there other pain locations you wish to document?: No         Medical History     Patient's medications, allergies, past medical, surgical, social and family histories were reviewed and updated as appropriate. Review of Systems  Pertinent items are noted in HPI  Review of systems reviewed from Patient History Form dated on 12/11/23 and available in the patient's chart under the Media tab. Vital Signs  There were no vitals filed for this visit. General Exam:     Constitutional: Patient is adequately groomed with no evidence of malnutrition  DTRs: Deep tendon reflexes are intact  Mental Status: The patient is oriented to time, place and person. The patient's mood and affect are appropriate. Lymphatic: The lymphatic examination bilaterally reveals all areas to be without enlargement or induration. Vascular: Examination reveals no swelling or calf tenderness. Peripheral pulses are palpable and 2+. Neurological: The patient has good coordination. There is no weakness or sensory deficit. Knee Examination  Inspection: No gross abnormalities noted. She does have obvious degenerative changes to her right knee with trace knee joint effusions bilaterally with right knee medial compartment crepitation.     Palpation: Tenderness to palpation of the left knee on the anterior

## 2023-12-12 ENCOUNTER — HOSPITAL ENCOUNTER (OUTPATIENT)
Dept: PHYSICAL THERAPY | Age: 73
Setting detail: THERAPIES SERIES
Discharge: HOME | End: 2023-12-12
Payer: MEDICARE

## 2023-12-12 DIAGNOSIS — M25.562 LEFT KNEE PAIN, UNSPECIFIED CHRONICITY: Primary | ICD-10-CM

## 2023-12-12 PROCEDURE — 97110 THERAPEUTIC EXERCISES: CPT

## 2023-12-12 PROCEDURE — 97161 PT EVAL LOW COMPLEX 20 MIN: CPT

## 2023-12-12 NOTE — FLOWSHEET NOTE
Program: HEP discussed and performed, see exercise grid      ASSESSMENT     Today's Assessment: See Eval    Medical Necessity Documentation:  I certify that this patient meets the below criteria necessary for medical necessity for care and/or justification of therapy services: The patient has functional impairments and/or activity limitations and would benefit from continued outpatient therapy services to address the deficits outlined in the patients goals    Treatment/Activity Tolerance:  [x] Patient tolerated treatment well [] Patient limited by fatique  [] Patient limited by pain  [] Patient limited by other medical complications  [] Other:     Return to Play: NA    Prognosis for POC: [x] Good [] Fair  [] Poor    Patient requires continued skilled intervention: [x] Yes  [] No      GOALS     Patient stated goal: decrease pain   Status: [] Progressing: [] Met: [] Not Met: [] Adjusted     Therapist goals for Patient:   Short Term Goals: To be achieved in: 2 weeks  Independent in HEP and progression per patient tolerance, in order to progress toward full function and prevent re-injury. Status: [] Progressing: [] Met: [] Not Met: [] Adjusted  Patient will have a decrease in pain to 2/10 to help  facilitate improvement in movement, function, and ADLs as indicated by functional deficits. Status: [] Progressing: [] Met: [] Not Met: [] Adjusted     Long Term Goals: To be achieved in: 6 weeks  Disability index score of 40% or less for the LEFS to assist with return top prior level of function. Status: [] Progressing: [] Met: [] Not Met: [] Adjusted  LLE AROM = RLE AROM to allow for proper joint functioning as indicated by patients functional deficits. Status: [] Progressing: [] Met: [] Not Met: [] Adjusted  Pt to improve strength to show motor control/activation of proximal hip to facilitate functional mobility and ADLs.    Status: [] Progressing: [] Met: [] Not Met: []

## 2023-12-12 NOTE — PLAN OF CARE
Flexion 5 5     Extension 5 5             ANKLE DF 4- 4-     PF       Inversion       Eversion        Repeated Movements: [] Normal  [] Abnormal [x] N/A    Palpation:   Unremarkable  Location:bilateral knees     Posture:   genu valgum Bilateral    Bandages/Dressings/Incisions:  Not Applicable    Dermatomes: Abnormal findings listed below  All WNL    Myotomes: Abnormal findings listed below  All WNL    Reflexes: Abnormal findings listed below  Not tested    Specific Joint Mobility Testing/Accessory Motions:      N/A    Special Tests:  N/A    Gait:    Pattern: antalgic pattern, Increased ELLIOT, decreased step length, lack heel strike bilaterally, decreased step length bilaterally, and decreased stance time on left  Assistive Device Used: Single point cane on right    Balance:  [x] WNL      [] NT       [] Dysfunction noted  Comment:     Falls Risk Assessment (30 days): Falls Risk assessed and patient requires intervention due to being higher risk   Time Up and Go (TUG):   Not Assessed  15-16 seconds (At least 20% but less than 40% functional limitation)      ASSESSMENT   Assessment:   Radu Juárez is a 74 y.o. female presenting today to Outpatient PT with signs and symptoms consistent with bilateral OA. Pt. presents with the functional impairments and activity limitations listed below and would benefit from Outpatient PT to address the below impairments as well as improve pain, and restore function.      Functional Impairments:   Decreased LE functional ROM  Decreased core/proximal hip strength and neuromuscular control  Decreased LE functional strength   Reduced balance/proprioceptive control    Functional Activity Limitations (from functional questionnaire and intake):  Reduced ability to tolerate prolonged functional positions  Reduced ability or difficulty with changes of positions or transfers between positions  Reduced ability to squat  Reduced ability to ambulate prolonged functional

## 2023-12-13 ENCOUNTER — OFFICE VISIT (OUTPATIENT)
Dept: ORTHOPEDIC SURGERY | Age: 73
End: 2023-12-13
Payer: MEDICARE

## 2023-12-13 DIAGNOSIS — M25.561 ACUTE PAIN OF BOTH KNEES: ICD-10-CM

## 2023-12-13 DIAGNOSIS — M25.562 ACUTE PAIN OF BOTH KNEES: ICD-10-CM

## 2023-12-13 DIAGNOSIS — M22.42 CHONDROMALACIA OF BOTH PATELLAE: ICD-10-CM

## 2023-12-13 DIAGNOSIS — M17.0 BILATERAL PRIMARY OSTEOARTHRITIS OF KNEE: Primary | ICD-10-CM

## 2023-12-13 DIAGNOSIS — M22.41 CHONDROMALACIA OF BOTH PATELLAE: ICD-10-CM

## 2023-12-13 PROCEDURE — G8484 FLU IMMUNIZE NO ADMIN: HCPCS | Performed by: FAMILY MEDICINE

## 2023-12-13 PROCEDURE — 1123F ACP DISCUSS/DSCN MKR DOCD: CPT | Performed by: FAMILY MEDICINE

## 2023-12-13 PROCEDURE — 99213 OFFICE O/P EST LOW 20 MIN: CPT | Performed by: FAMILY MEDICINE

## 2023-12-13 PROCEDURE — G8399 PT W/DXA RESULTS DOCUMENT: HCPCS | Performed by: FAMILY MEDICINE

## 2023-12-13 PROCEDURE — 1036F TOBACCO NON-USER: CPT | Performed by: FAMILY MEDICINE

## 2023-12-13 PROCEDURE — 1090F PRES/ABSN URINE INCON ASSESS: CPT | Performed by: FAMILY MEDICINE

## 2023-12-13 PROCEDURE — G8428 CUR MEDS NOT DOCUMENT: HCPCS | Performed by: FAMILY MEDICINE

## 2023-12-13 PROCEDURE — 3017F COLORECTAL CA SCREEN DOC REV: CPT | Performed by: FAMILY MEDICINE

## 2023-12-13 PROCEDURE — 20610 DRAIN/INJ JOINT/BURSA W/O US: CPT | Performed by: FAMILY MEDICINE

## 2023-12-13 PROCEDURE — G8417 CALC BMI ABV UP PARAM F/U: HCPCS | Performed by: FAMILY MEDICINE

## 2023-12-13 RX ORDER — HYALURONATE SODIUM 10 MG/ML
40 SYRINGE (ML) INTRAARTICULAR ONCE
Status: COMPLETED | OUTPATIENT
Start: 2023-12-13 | End: 2023-12-13

## 2023-12-13 RX ADMIN — Medication 40 MG: at 12:53

## 2023-12-13 NOTE — PROGRESS NOTES
with right knee medial compartment crepitation. Palpation: Improved tenderness to palpation of the left knee on the anterior aspect. This is primarily over the lateral greater than medial patellofemoral facet. Slight tenderness around the patella itself but more so anteriorly and inferiorly to the patella. She does have diffuse medial joint line tenderness right greater than left. Rang of Motion: She does lack 5 -10 degrees of extension on the right -5 degrees on the left with flexion to about 110 bilaterally. Hamstrings are tight. Strength: 4 to 4+ out of 5 knee flexion extension. Special Tests: She does have a less prominent pain reproduced patellar grind testing. Moderately improved pain crepitation with medial Tammi's more so right than left. No high-grade meniscal click on the left. There is no evidence of instability. Negative logroll testing. Skin: There are no rashes, ulcerations or lesions. Distal neurovascular exam is intact. Gait: Improved gait. She has been able to discontinue her walker and is using a cane. She normally utilizes a cane for her significant right knee osteoarthritis. Reflex symmetrically preserved    Additional Comments:     Additional Examinations:  Right Lower Extremity: Examination of the right lower extremity does not show any tenderness, deformity or injury. Range of motion is unremarkable. There is no gross instability. There are no rashes, ulcerations or lesions. Strength and tone are normal.  Left lower Extremity: Examination of the left lower extremity does not show any tenderness, deformity or injury. Range of motion is unremarkable. There is no gross instability. There are no rashes, ulcerations or lesions.   Strength and tone are normal.    Diagnostic Test Findings: 4 view left knee AP and PA weightbearing sunrise and lateral films with comparison weightbearing views right knee obtained on 11/27/2023 does show advanced end-stage right

## 2023-12-15 ENCOUNTER — HOSPITAL ENCOUNTER (OUTPATIENT)
Dept: PHYSICAL THERAPY | Age: 73
Setting detail: THERAPIES SERIES
Discharge: HOME | End: 2023-12-15
Payer: MEDICARE

## 2023-12-15 PROCEDURE — 97110 THERAPEUTIC EXERCISES: CPT

## 2023-12-15 NOTE — FLOWSHEET NOTE
[address] [place] [address] and Therapy  [facility] [place], [address] office: [phone] fax: [phone]      Physical Therapy: TREATMENT/PROGRESS NOTE   Patient: Sandy Billingsley (25 y.o. female)   Treatment Date: 12/15/2023   :  1950 MRN: [id number]   Visit #: 2   Insurance Allowable Auth Needed   [place] []Yes    [x]No    Insurance: Payor: MEDICARE / Plan: MEDICARE PART A AND B / Product Type: *No Product type* /   Insurance ID: [id number] - (Medicare)  Secondary Insurance (if applicable): [employer]   Treatment Diagnosis:     ICD-10-CM    1. Left knee pain, unspecified chronicity  M25.562          Medical Diagnosis:    Localized osteoarthritis of left knee [M17.12]   Referring Physician: James Colorado PA-C  PCP: Brianna Feldman,                              Plan of care signed (Y/N): Y 23    Date of Patient follow up with Physician:      Progress Report/POC: NO  POC update due: (10 visits /OR [address], whichever is less)  2024 PN due 24      Preferred Language for Healthcare:   [x]English       []other:    SUBJECTIVE EXAMINATION     Patient Report/Comments: no change in pain 6/10 today      Test used Initial score  12/12/23 12/15/2023   Pain Summary VAS 4-5 6/10   Functional questionnaire LEFS 22/80  72% disability    Other:                OBJECTIVE EXAMINATION     Observation: arrives using straight cane , antalgic side to side swagger type gait pattern.      Test measurements: see eval    Exercises/Interventions:     Therapeutic Ex (64728) 40 min resistance Sets/time Reps Notes/Cues/Progressions   NuStep  10 min   For strength and endurance subjective info taken during activity   LAQ 2# 2 10 Down slow on 3 count   Glut sets Hold 5 sec 3 10    Quad sets Hold 5 sec 3 10    Add iso  3 10    Abduction t band hooklying RTB 3  10    Marching RTB 3 10                                Manual Intervention (62126)  TIME

## 2023-12-26 ENCOUNTER — HOSPITAL ENCOUNTER (OUTPATIENT)
Dept: PHYSICAL THERAPY | Age: 73
Setting detail: THERAPIES SERIES
Discharge: HOME | End: 2023-12-26
Payer: MEDICARE

## 2023-12-26 PROCEDURE — 97110 THERAPEUTIC EXERCISES: CPT

## 2023-12-26 NOTE — FLOWSHEET NOTE
[] Progressing: [] Met: [] Not Met: [] Adjusted  Patient will have a decrease in pain to 2/10 to help  facilitate improvement in movement, function, and ADLs as indicated by functional deficits. Status: [] Progressing: [] Met: [] Not Met: [] Adjusted     Long Term Goals: To be achieved in: 6 weeks  Disability index score of 40% or less for the LEFS to assist with return top prior level of function. Status: [] Progressing: [] Met: [] Not Met: [] Adjusted  LLE AROM = RLE AROM to allow for proper joint functioning as indicated by patients functional deficits. Status: [] Progressing: [] Met: [] Not Met: [] Adjusted  Pt to improve strength to show motor control/activation of proximal hip to facilitate functional mobility and ADLs. Status: [] Progressing: [] Met: [] Not Met: [] Adjusted  Patient will return to Usual work, housework or activities without increased symptoms or restriction to work towards return to prior level of function. Status: [] Progressing: [] Met: [] Not Met: [] Adjusted  Patient will increase LE function to allow independence in all self-care activities. Status: [] Progressing: [] Met: [] Not Met: [] Adjusted    Overall Progression Towards Functional goals/ Treatment Progress Update:  [] Patient is progressing as expected towards functional goals listed. [] Progression is slowed due to complexities/Impairments listed. [] Progression has been slowed due to co-morbidities.   [x] Plan just implemented, too soon (<30days) to assess goals progression   [] Goals require adjustment due to lack of progress  [] Patient is not progressing as expected and requires additional follow up with physician  [] Other:     CHARGE CAPTURE     PT CHARGE GRID   CPT Code (TIMED) minutes # CPT Code (UNTIMED) #     Therex (24882)  42 3  EVAL:LOW (11385 - Typically 20 minutes

## 2023-12-27 ENCOUNTER — OFFICE VISIT (OUTPATIENT)
Dept: ORTHOPEDIC SURGERY | Age: 73
End: 2023-12-27

## 2023-12-27 DIAGNOSIS — M25.562 ACUTE PAIN OF BOTH KNEES: ICD-10-CM

## 2023-12-27 DIAGNOSIS — M17.0 BILATERAL PRIMARY OSTEOARTHRITIS OF KNEE: Primary | ICD-10-CM

## 2023-12-27 DIAGNOSIS — M22.41 CHONDROMALACIA OF BOTH PATELLAE: ICD-10-CM

## 2023-12-27 DIAGNOSIS — M22.42 CHONDROMALACIA OF BOTH PATELLAE: ICD-10-CM

## 2023-12-27 DIAGNOSIS — M25.561 ACUTE PAIN OF BOTH KNEES: ICD-10-CM

## 2023-12-27 RX ORDER — HYALURONATE SODIUM 10 MG/ML
20 SYRINGE (ML) INTRAARTICULAR ONCE
Status: COMPLETED | OUTPATIENT
Start: 2023-12-27 | End: 2023-12-27

## 2023-12-27 RX ADMIN — Medication 20 MG: at 13:03

## 2023-12-27 RX ADMIN — Medication 20 MG: at 13:02

## 2023-12-27 NOTE — PROGRESS NOTES
CC:  FU Knee Osteoarthritis with Viscosupplementation      FU left greater than right knee pain status post fall 11/17/23 with known history of advanced right knee osteoarthritis and more moderate left knee medial compartment osteoarthritis patellofemoral arthropathy with synovitis    History of Present Illness:  Janet Garcia is a 74 y.o. female   Patient presents to office after a visit to urgent care. 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 Bob White who is being seen today upon referral from Janet Donaldson PA-C for evaluation of an injury to her left greater than right knee after slipping on her cane at home on 11/17/2023. She states that she fell on November 17 after coming in from the rain. She attempted to dry her cane on the rug however when she put it on the ceramic tile and tried to take a step forward it went out from under her. She states that she slammed her knee into the ceramic tile and twisted her ankle on the left. She states that her ankle hurt at that time but that the knee did not start to hurt until 5 days later. She states she did not hear any pop, crack, or snap. She states that her knee did not swell up. She states that since the initial fall and visit to urgent care she is about 50% better. She has been icing her knee and taking meloxicam since last Tuesday. She follows with Dr. Rochelle Johnson usually. She is interested in getting a cortisone injection for the left knee and gel injections for both knees in the future. She states that when she is sitting she does not have any pain but when she stands for too long she has a 4-5 out of 10. She states that she had breast cancer back in February and is on anastrozole 1 mg daily. She states her sugar typically goes into the 300s for 3 days after a single cortisone shot.   She is being seen today for orthopedic and sports consultation with review of her recent

## 2023-12-29 ENCOUNTER — HOSPITAL ENCOUNTER (OUTPATIENT)
Dept: PHYSICAL THERAPY | Age: 73
Setting detail: THERAPIES SERIES
Discharge: HOME | End: 2023-12-29
Payer: MEDICARE

## 2023-12-29 PROCEDURE — 97110 THERAPEUTIC EXERCISES: CPT

## 2024-01-02 ENCOUNTER — HOSPITAL ENCOUNTER (OUTPATIENT)
Dept: PHYSICAL THERAPY | Age: 74
Setting detail: THERAPIES SERIES
Discharge: HOME | End: 2024-01-02
Payer: MEDICARE

## 2024-01-02 PROCEDURE — 97110 THERAPEUTIC EXERCISES: CPT

## 2024-01-02 NOTE — FLOWSHEET NOTE
[facility]- Outpatient Rehabilitation and Therapy  [facility] [place] [address] office: [phone] fax: [phone]      Physical Therapy: TREATMENT/PROGRESS NOTE   Patient: Suzi Alas (73 y.o. female)   Treatment Date: 2024   :  1950 MRN: [id number]   Visit #:   Insurance Allowable Auth Needed   [place] []Yes    [x]No    Insurance: Payor: MEDICARE / Plan: MEDICARE PART A AND B / Product Type: *No Product type* /   Insurance ID: [id number] - (Medicare)  Secondary Insurance (if applicable): [employer]   Treatment Diagnosis:     ICD-10-CM    1. Left knee pain, unspecified chronicity  M25.562          Medical Diagnosis:    Localized osteoarthritis of left knee [M17.12]   Referring Physician: Toi Molina PA-C  PCP: Emmanuel Mccauley DO                             Plan of care signed (Y/N): Y 23    Date of Patient follow up with Physician:      Progress Report/POC: NO  POC update due: (10 visits /OR AUTH LIMITS, whichever is less)  2024 PN due 24      Preferred Language for Healthcare:   [x]English       []other:    SUBJECTIVE EXAMINATION     Patient Report/Comments: Patient reports pain is a little worse past two days.  Was on feet a lot more, making meals for family members over the holiday.  Friday is last scheduled appointment and patient feels she is ready for discharge to continue HEP on her own.     Test used Initial score  23   Pain Summary VAS 4-5 4/10 \"stiff\"   Functional questionnaire LEFS 22/80  72% disability    Other:                OBJECTIVE EXAMINATION     Observation: arrives using straight cane , antalgic side to side swagger type gait pattern.     Test measurements: see eval    Exercises/Interventions: all done with BLE    Therapeutic Ex (77247) 42 min resistance Sets/time Reps Notes/Cues/Progressions   NuStep L6 10 min   For strength and endurance subjective info taken during activity   LAQ 3 # 3 10 Down slow on 3

## 2024-01-05 ENCOUNTER — HOSPITAL ENCOUNTER (OUTPATIENT)
Dept: PHYSICAL THERAPY | Age: 74
Setting detail: THERAPIES SERIES
Discharge: HOME | End: 2024-01-05
Payer: MEDICARE

## 2024-01-05 PROCEDURE — 97110 THERAPEUTIC EXERCISES: CPT

## 2024-01-05 NOTE — DISCHARGE SUMMARY
[facility]- Outpatient Rehabilitation and Therapy  [facility] [place] [address] office: [phone] fax: [phone]      Physical Therapy: TREATMENT/PROGRESS NOTE   Patient: Suzi Alas (73 y.o. female)   Treatment Date: 2024   :  1950 MRN: [id number]   Visit #:   Insurance Allowable Auth Needed   [place] []Yes    [x]No    Insurance: Payor: MEDICARE / Plan: MEDICARE PART A AND B / Product Type: *No Product type* /   Insurance ID: [id number] - (Medicare)  Secondary Insurance (if applicable): MED[employer]   Treatment Diagnosis:     ICD-10-CM    1. Left knee pain, unspecified chronicity  M25.562          Medical Diagnosis:    Localized osteoarthritis of left knee [M17.12]   Referring Physician: Toi Molina PA-C  PCP: Emmanuel Mccauley DO                             Plan of care signed (Y/N): Y 23    Date of Patient follow up with Physician:      Progress Report/POC: YES and Date Range for this report: 23-24  POC update due: (10 visits /OR AUTH LIMITS, whichever is less)  discharge today      Preferred Language for Healthcare:   [x]English       []other:    SUBJECTIVE EXAMINATION     Patient Report/Comments: Patient reports has had two good days with less pain.      Test used Initial score  23   Pain Summary VAS 4-5 0/10 at rest  5/10 at worst   Functional questionnaire LEFS 22/80  72% disability 54/80  32% disability   Other:                OBJECTIVE EXAMINATION     Observation: arrives using straight cane , antalgic side to side swagger type gait pattern.     Test measurements:     ROM/Strength: (Blank cells denote NT)        Mvmt (norm) AROM L AROM R Notes PROM L PROM R Notes                        LUMBAR Flex (90)              Ext (25)              SB (25)                Rotation (30)                                       HIP Flex (120)                Abd (45)                ER (50)                IR (45)                Ext (20)

## 2024-02-15 NOTE — PROGRESS NOTES
"Preventive Care Visit  [facility]  William Green MD, Internal Medicine  Feb 15, 2024    Assessment & Plan     Routine general medical examination at a health care facility  Updated screening, immunizations, prevention.  Please see health maintenance list, care gaps     Family history of bicuspid aortic valve  - Echocardiogram Complete; Future    Mild intermittent asthma without complication  Switch rescue inhaler to symbicort from xopenex  - budesonide-formoterol (SYMBICORT) 80-4.5 MCG/ACT Inhaler; Inhale 1-2 puffs into the lungs every 4 hours as needed (wheezing, SOB and/or coughing) (Max 12 inh/day)    Lipid screening  - Lipid panel reflex to direct LDL Fasting; Future  - Lipid panel reflex to direct LDL Fasting    Screening for diabetes mellitus  - Basic metabolic panel; Future  - Basic metabolic panel    Tear of left glenoid labrum, initial encounter  - Orthopedic  Referral; Future    Review of the result(s) of each unique test - labs  Ordering of each unique test  Prescription drug management        BMI  Estimated body mass index is 28.48 kg/m  as calculated from the following:    Height as of this encounter: 1.803 m (5' 11\").    Weight as of this encounter: 92.6 kg (204 lb 3.2 oz).       Counseling  Appropriate preventive services were discussed with this patient, including applicable screening as appropriate for fall prevention, nutrition, physical activity, Tobacco-use cessation, weight loss and cognition.  Checklist reviewing preventive services available has been given to the patient.  Reviewed patient's diet, addressing concerns and/or questions.   He is at risk for lack of exercise and has been provided with information to increase physical activity for the benefit of his well-being.     Patient has been advised of split billing requirements and indicates understanding: Yes    See Patient Instructions    Lupillo Brock is a 52 year old, presenting for the " Patient is now a Phase II patient following:  Physical      Health Care Directive  Patient does not have a Health Care Directive or Living Will: Discussed advance care planning with patient; information given to patient to review.    HPI   Cruise 1/27- 2/61   In [place] 2/7 - got sick- cough, ST, sinus congestion.             2/15/2024   General Health   How would you rate your overall physical health? (!) FAIR   Feel stress (tense, anxious, or unable to sleep) To some extent   (!) STRESS CONCERN      2/15/2024   Nutrition   Three or more servings of calcium each day? (!) NO   Diet: Regular (no restrictions)   How many servings of fruit and vegetables per day? (!) 0-1   How many sweetened beverages each day? 0-1         2/15/2024   Exercise   Days per week of moderate/strenous exercise 1 day   Average minutes spent exercising at this level 10 min   (!) EXERCISE CONCERN      2/15/2024   Social Factors   Frequency of gathering with friends or relatives Twice a week   Worry food won't last until get money to buy more No   Food not last or not have enough money for food? No   Do you have housing?  Yes   Are you worried about losing your housing? No   Lack of transportation? No   Unable to get utilities (heat,electricity)? No         2/15/2024   Fall Risk   Fallen 2 or more times in the past year? No   Trouble with walking or balance? Yes           2/15/2024   Dental   Dentist two times every year? Yes         2/15/2024   TB Screening   Were you born outside of [place]?  No         Today's PHQ-2 Score:       2/15/2024     9:27 AM   PHQ-2 ( 1999 [employer])   Q1: Little interest or pleasure in doing things 0   Q2: Feeling down, depressed or hopeless 0   PHQ-2 Score 0   Q1: Little interest or pleasure in doing things Not at all   Q2: Feeling down, depressed or hopeless Not at all   PHQ-2 Score 0           2/15/2024   Substance Use   Alcohol more than 3/day or more than 7/wk No   Do you use any other substances recreationally? (!) ALCOHOL     Social History     Tobacco  "Use    Smoking status: Former     Packs/day: 1.00     Years: 6.00     Additional pack years: 0.00     Total pack years: 6.00     Types: Cigarettes     Quit date: 1995     Years since quittin.1    Smokeless tobacco: Never   Substance Use Topics    Alcohol use: Yes     Alcohol/week: 0.8 - 1.7 standard drinks of alcohol     Types: 1 - 2 Standard drinks or equivalent per week     Comment: once weekly    Drug use: No           2/15/2024   STI Screening   New sexual partner(s) since last STI/HIV test? No   The 10-year ASCVD risk score (Sal JARRELL, et al., 2019) is: 3.4%    Values used to calculate the score:      Age: 52 years      Sex: Male      Is Non- : No      Diabetic: No      Tobacco smoker: No      Systolic Blood Pressure: 118 mmHg      Is BP treated: No      HDL Cholesterol: 55 mg/dL      Total Cholesterol: 173 mg/dL           Reviewed and updated as needed this visit by Provider                             Objective    Exam  /76   Pulse 74   Temp 98.4  F (36.9  C) (Temporal)   Resp 17   Ht 1.803 m (5' 11\")   Wt 92.6 kg (204 lb 3.2 oz)   SpO2 97%   BMI 28.48 kg/m     Estimated body mass index is 28.48 kg/m  as calculated from the following:    Height as of this encounter: 1.803 m (5' 11\").    Weight as of this encounter: 92.6 kg (204 lb 3.2 oz).    Physical Exam  GENERAL: alert and no distress  EYES: Eyes grossly normal to inspection, PERRL and conjunctivae and sclerae normal  HENT: ear canals and TM's normal, nose and mouth without ulcers or lesions  NECK: no adenopathy, no asymmetry, masses, or scars  RESP: lungs clear to auscultation - no rales, rhonchi or wheezes  CV: regular rate and rhythm, normal S1 S2, no S3 or S4, no murmur, click or rub, no peripheral edema  ABDOMEN: soft, nontender, no hepatosplenomegaly, no masses and bowel sounds normal  MS: no gross musculoskeletal defects noted, no edema  SKIN: no suspicious lesions or rashes  NEURO: Normal strength and " tone, mentation intact and speech normal  PSYCH: mentation appears normal, affect normal/bright  LYMPH: no cervical, supraclavicular, axillary, or inguinal adenopathy      Signed Electronically by: William Green MD

## 2024-02-20 ENCOUNTER — TELEPHONE (OUTPATIENT)
Dept: ORTHOPEDIC SURGERY | Age: 74
End: 2024-02-20

## 2024-02-20 NOTE — TELEPHONE ENCOUNTER
Appointment Request     Patient requesting earlier appointment: Yes  Appointment offered to patient: INQUIRING IF THERE IS SOMEONE ELSE SHE CAN SEE FOR HER KNEE.  Patient Contact Number: [phone]

## 2024-02-22 ENCOUNTER — OFFICE VISIT (OUTPATIENT)
Dept: ORTHOPEDIC SURGERY | Age: 74
End: 2024-02-22
Payer: MEDICARE

## 2024-02-22 DIAGNOSIS — M17.0 BILATERAL PRIMARY OSTEOARTHRITIS OF KNEE: Primary | ICD-10-CM

## 2024-02-22 DIAGNOSIS — M25.562 ACUTE PAIN OF LEFT KNEE: ICD-10-CM

## 2024-02-22 PROCEDURE — G8399 PT W/DXA RESULTS DOCUMENT: HCPCS | Performed by: ORTHOPAEDIC SURGERY

## 2024-02-22 PROCEDURE — G8484 FLU IMMUNIZE NO ADMIN: HCPCS | Performed by: ORTHOPAEDIC SURGERY

## 2024-02-22 PROCEDURE — 1036F TOBACCO NON-USER: CPT | Performed by: ORTHOPAEDIC SURGERY

## 2024-02-22 PROCEDURE — G8427 DOCREV CUR MEDS BY ELIG CLIN: HCPCS | Performed by: ORTHOPAEDIC SURGERY

## 2024-02-22 PROCEDURE — G8417 CALC BMI ABV UP PARAM F/U: HCPCS | Performed by: ORTHOPAEDIC SURGERY

## 2024-02-22 PROCEDURE — 1123F ACP DISCUSS/DSCN MKR DOCD: CPT | Performed by: ORTHOPAEDIC SURGERY

## 2024-02-22 PROCEDURE — 99214 OFFICE O/P EST MOD 30 MIN: CPT | Performed by: ORTHOPAEDIC SURGERY

## 2024-02-22 PROCEDURE — 1090F PRES/ABSN URINE INCON ASSESS: CPT | Performed by: ORTHOPAEDIC SURGERY

## 2024-02-22 NOTE — PROGRESS NOTES
Dr Killian Downey      Date /Time 2/22/2024       2:09 PM EST  Name Suzi Alas             1950   [facility]  [facility]  MRN [id number]                Chief Complaint   Patient presents with    Knee Pain     CK LIS KNEES         History of Present Illness  Suzi Alas is a 74 y.o. female who presents with  bilateral knee pain, .    Sent in consultation by Emmanuel Mccauley DO, .      Patient presents the office today for a new problem to me.  Patient previously saw Dr. Hackett.  Patient is being treated for bilateral knee osteoarthritis.  Patient has received cortisone injections in the past.  Patient then completed a series of viscosupplementation injections bilateral knee on December 27, 2023.  Patient has also had a cortisone injection done on December 11, 2023.  She has been having increased pain and swelling without new injury or trauma  .  Previously patient has had a right knee subchondral plasty done by Dr. Alex Keene.  Her left knee is more painful than her right    Past History  Past Medical History:   Diagnosis Date    Acid reflux     Arthritis     Diabetes mellitus (HCC)     Heart murmur     Hyperlipidemia     Hypertension     Kidney stone     PONV (postoperative nausea and vomiting)     Thyroid cancer (HCC)     S/P thyroidectomy     Past Surgical History:   Procedure Laterality Date    BREAST BIOPSY Left 3/27/2023    LUMPECTOMY LEFT BREAST, LEFT BREAST SENTINEL NODE BIOPSY, LEFT POSSIBLE AXILLARY NODE BIOPSY, INJECTION OF LYMPHAZURIN AND LYMPHOSEEK,  IMAGE GUIDED MODALITY ; ULTRASOUND LATERALITY LEFT BREAST 1 RADIOFREQUENCY TAG, 6 O'CLOCK AND FTN: 5CM TARGET MASS (3.14.23) 12:30PM performed by Brandy Lewis MD at [facility] OR    BREAST SURGERY Left 3/27/2023    . performed by Brandy Lewis MD at [facility] OR    CHOLECYSTECTOMY  1995    FRACTURE SURGERY      nose    KNEE ARTHROSCOPY Right 10/04/2021    RIGHT KNEE VIDEO ARTHROSCOPY, MEDIAL AND LATERAL MENISECTOMY,

## 2024-02-23 ENCOUNTER — TELEPHONE (OUTPATIENT)
Dept: ORTHOPEDIC SURGERY | Age: 74
End: 2024-02-23

## 2024-02-23 NOTE — TELEPHONE ENCOUNTER
No pre-cert required for MRI Lf Knee   Patients primary insurance is Medicare A&B.   Medicare A&B does NOT require a pre-cert.       [employer]/ Patient/ Follow up in office to review     jm

## 2024-02-27 RX ORDER — MELOXICAM 15 MG/1
15 TABLET ORAL DAILY PRN
Qty: 90 TABLET | Refills: 0 | Status: SHIPPED | OUTPATIENT
Start: 2024-02-27

## 2024-03-07 ENCOUNTER — OFFICE VISIT (OUTPATIENT)
Dept: ORTHOPEDIC SURGERY | Age: 74
End: 2024-03-07

## 2024-03-07 VITALS — BODY MASS INDEX: 33.99 KG/M2 | WEIGHT: 180 LBS | HEIGHT: 61 IN

## 2024-03-07 DIAGNOSIS — M17.12 LOCALIZED OSTEOARTHRITIS OF LEFT KNEE: Primary | ICD-10-CM

## 2024-03-07 RX ORDER — LIDOCAINE HYDROCHLORIDE 10 MG/ML
5 INJECTION, SOLUTION INFILTRATION; PERINEURAL ONCE
Status: COMPLETED | OUTPATIENT
Start: 2024-03-07 | End: 2024-03-07

## 2024-03-07 RX ORDER — TRIAMCINOLONE ACETONIDE 40 MG/ML
40 INJECTION, SUSPENSION INTRA-ARTICULAR; INTRAMUSCULAR ONCE
Status: COMPLETED | OUTPATIENT
Start: 2024-03-07 | End: 2024-03-07

## 2024-03-07 RX ORDER — BUPIVACAINE HYDROCHLORIDE 2.5 MG/ML
30 INJECTION, SOLUTION INFILTRATION; PERINEURAL ONCE
Status: COMPLETED | OUTPATIENT
Start: 2024-03-07 | End: 2024-03-07

## 2024-03-07 RX ADMIN — BUPIVACAINE HYDROCHLORIDE 75 MG: 2.5 INJECTION, SOLUTION INFILTRATION; PERINEURAL at 15:49

## 2024-03-07 RX ADMIN — LIDOCAINE HYDROCHLORIDE 5 ML: 10 INJECTION, SOLUTION INFILTRATION; PERINEURAL at 15:49

## 2024-03-07 RX ADMIN — TRIAMCINOLONE ACETONIDE 40 MG: 40 INJECTION, SUSPENSION INTRA-ARTICULAR; INTRAMUSCULAR at 15:50

## 2024-03-07 NOTE — PROGRESS NOTES
should conservative measures fail.     Electronically signed by Killian Downey MD on 2/22/2024 at 2:09 PM  This dictation was generated by voice recognition computer software.  Although all attempts are made to edit the dictation for accuracy, there may be errors in the transcription that are not intended.

## 2024-03-27 ENCOUNTER — OFFICE VISIT (OUTPATIENT)
Dept: ORTHOPEDIC SURGERY | Age: 74
End: 2024-03-27
Payer: MEDICARE

## 2024-03-27 DIAGNOSIS — M22.42 CHONDROMALACIA OF BOTH PATELLAE: ICD-10-CM

## 2024-03-27 DIAGNOSIS — M17.12 LOCALIZED OSTEOARTHRITIS OF LEFT KNEE: Primary | ICD-10-CM

## 2024-03-27 DIAGNOSIS — M22.41 CHONDROMALACIA OF BOTH PATELLAE: ICD-10-CM

## 2024-03-27 DIAGNOSIS — M17.0 BILATERAL PRIMARY OSTEOARTHRITIS OF KNEE: ICD-10-CM

## 2024-03-27 DIAGNOSIS — M25.562 ACUTE PAIN OF LEFT KNEE: ICD-10-CM

## 2024-03-27 PROCEDURE — G8399 PT W/DXA RESULTS DOCUMENT: HCPCS | Performed by: FAMILY MEDICINE

## 2024-03-27 PROCEDURE — 99213 OFFICE O/P EST LOW 20 MIN: CPT | Performed by: FAMILY MEDICINE

## 2024-03-27 PROCEDURE — G8484 FLU IMMUNIZE NO ADMIN: HCPCS | Performed by: FAMILY MEDICINE

## 2024-03-27 PROCEDURE — G8417 CALC BMI ABV UP PARAM F/U: HCPCS | Performed by: FAMILY MEDICINE

## 2024-03-27 PROCEDURE — G8427 DOCREV CUR MEDS BY ELIG CLIN: HCPCS | Performed by: FAMILY MEDICINE

## 2024-03-27 PROCEDURE — 1123F ACP DISCUSS/DSCN MKR DOCD: CPT | Performed by: FAMILY MEDICINE

## 2024-03-27 PROCEDURE — 1036F TOBACCO NON-USER: CPT | Performed by: FAMILY MEDICINE

## 2024-03-27 PROCEDURE — 1090F PRES/ABSN URINE INCON ASSESS: CPT | Performed by: FAMILY MEDICINE

## 2024-03-27 RX ORDER — CELECOXIB 200 MG/1
200 CAPSULE ORAL DAILY
Qty: 30 CAPSULE | Refills: 3 | Status: SHIPPED | OUTPATIENT
Start: 2024-03-27

## 2024-03-27 NOTE — PROGRESS NOTES
Chief Complaint  Knee Pain (CK LIS KNEES )    FU left greater than right knee pain status post fall 11/17/23 with known history of advanced right knee osteoarthritis and more moderate left knee medial compartment osteoarthritis patellofemoral arthropathy status post completion of bilateral knee viscosupplementation 12/27/2023    History of Present Illness:  Suzi Alas is a 74 y.o. female   Patient presents to office after a visit to urgent care.  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 Emmanuel Anne who is being seen today upon referral from Quintin Molina PA-C for evaluation of an injury to her left greater than right knee after slipping on her cane at home on 11/17/2023.  She states that she fell on November 17 after coming in from the rain.  She attempted to dry her cane on the rug however when she put it on the ceramic tile and tried to take a step forward it went out from under her.  She states that she slammed her knee into the ceramic tile and twisted her ankle on the left.  She states that her ankle hurt at that time but that the knee did not start to hurt until 5 days later.  She states she did not hear any pop, crack, or snap.  She states that her knee did not swell up.  She states that since the initial fall and visit to urgent care she is about 50% better.  She has been icing her knee and taking meloxicam since last Tuesday.  She follows with Dr. Keene usually.  She is interested in getting a cortisone injection for the left knee and gel injections for both knees in the future.  She states that when she is sitting she does not have any pain but when she stands for too long she has a 4-5 out of 10.  She states that she had breast cancer back in February and is on anastrozole 1 mg daily.  She states her sugar typically goes into the 300s for 3 days after a single cortisone shot.  She is being seen today for orthopedic and

## 2024-07-08 ENCOUNTER — OFFICE VISIT (OUTPATIENT)
Dept: ORTHOPEDIC SURGERY | Age: 74
End: 2024-07-08
Payer: MEDICARE

## 2024-07-08 DIAGNOSIS — M25.562 ACUTE PAIN OF BOTH KNEES: ICD-10-CM

## 2024-07-08 DIAGNOSIS — M25.561 ACUTE PAIN OF BOTH KNEES: ICD-10-CM

## 2024-07-08 DIAGNOSIS — M22.42 CHONDROMALACIA OF BOTH PATELLAE: ICD-10-CM

## 2024-07-08 DIAGNOSIS — M17.0 BILATERAL PRIMARY OSTEOARTHRITIS OF KNEE: Primary | ICD-10-CM

## 2024-07-08 DIAGNOSIS — M22.41 CHONDROMALACIA OF BOTH PATELLAE: ICD-10-CM

## 2024-07-08 PROCEDURE — G8399 PT W/DXA RESULTS DOCUMENT: HCPCS | Performed by: FAMILY MEDICINE

## 2024-07-08 PROCEDURE — 1090F PRES/ABSN URINE INCON ASSESS: CPT | Performed by: FAMILY MEDICINE

## 2024-07-08 PROCEDURE — G8417 CALC BMI ABV UP PARAM F/U: HCPCS | Performed by: FAMILY MEDICINE

## 2024-07-08 PROCEDURE — 1036F TOBACCO NON-USER: CPT | Performed by: FAMILY MEDICINE

## 2024-07-08 PROCEDURE — 20610 DRAIN/INJ JOINT/BURSA W/O US: CPT | Performed by: FAMILY MEDICINE

## 2024-07-08 PROCEDURE — 1123F ACP DISCUSS/DSCN MKR DOCD: CPT | Performed by: FAMILY MEDICINE

## 2024-07-08 PROCEDURE — G8428 CUR MEDS NOT DOCUMENT: HCPCS | Performed by: FAMILY MEDICINE

## 2024-07-08 PROCEDURE — 99213 OFFICE O/P EST LOW 20 MIN: CPT | Performed by: FAMILY MEDICINE

## 2024-07-08 RX ORDER — BETAMETHASONE SODIUM PHOSPHATE AND BETAMETHASONE ACETATE 3; 3 MG/ML; MG/ML
24 INJECTION, SUSPENSION INTRA-ARTICULAR; INTRALESIONAL; INTRAMUSCULAR; SOFT TISSUE ONCE
Status: COMPLETED | OUTPATIENT
Start: 2024-07-08 | End: 2024-07-08

## 2024-07-08 RX ORDER — LIDOCAINE HYDROCHLORIDE 10 MG/ML
2 INJECTION, SOLUTION INFILTRATION; PERINEURAL ONCE
Status: COMPLETED | OUTPATIENT
Start: 2024-07-08 | End: 2024-07-08

## 2024-07-08 RX ORDER — HYALURONATE SODIUM 10 MG/ML
40 SYRINGE (ML) INTRAARTICULAR ONCE
Status: SHIPPED | OUTPATIENT
Start: 2024-07-08

## 2024-07-08 RX ORDER — BUPIVACAINE HYDROCHLORIDE 2.5 MG/ML
4 INJECTION, SOLUTION INFILTRATION; PERINEURAL ONCE
Status: COMPLETED | OUTPATIENT
Start: 2024-07-08 | End: 2024-07-08

## 2024-07-08 RX ADMIN — BETAMETHASONE SODIUM PHOSPHATE AND BETAMETHASONE ACETATE 24 MG: 3; 3 INJECTION, SUSPENSION INTRA-ARTICULAR; INTRALESIONAL; INTRAMUSCULAR; SOFT TISSUE at 13:56

## 2024-07-08 RX ADMIN — LIDOCAINE HYDROCHLORIDE 2 ML: 10 INJECTION, SOLUTION INFILTRATION; PERINEURAL at 13:57

## 2024-07-08 RX ADMIN — BUPIVACAINE HYDROCHLORIDE 10 MG: 2.5 INJECTION, SOLUTION INFILTRATION; PERINEURAL at 13:56

## 2024-07-08 NOTE — PROGRESS NOTES
Chief Complaint  Knee Pain (EUFLEXXA #1 BILATERAL KNEES/)    FU left greater than right knee pain status post fall 11/17/23 with known history of advanced right knee osteoarthritis and more moderate left knee medial compartment osteoarthritis patellofemoral arthropathy status post completion of bilateral knee viscosupplementation 12/27/2023    History of Present Illness:  Suzi Alas is a 74 y.o. female   Patient presents to office after a visit to urgent care.  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 Emmanuel Anne who is being seen today upon referral from Quintin Molina PA-C for evaluation of an injury to her left greater than right knee after slipping on her cane at home on 11/17/2023.  She states that she fell on November 17 after coming in from the rain.  She attempted to dry her cane on the rug however when she put it on the ceramic tile and tried to take a step forward it went out from under her.  She states that she slammed her knee into the ceramic tile and twisted her ankle on the left.  She states that her ankle hurt at that time but that the knee did not start to hurt until 5 days later.  She states she did not hear any pop, crack, or snap.  She states that her knee did not swell up.  She states that since the initial fall and visit to urgent care she is about 50% better.  She has been icing her knee and taking meloxicam since last Tuesday.  She follows with Dr. Keene usually.  She is interested in getting a cortisone injection for the left knee and gel injections for both knees in the future.  She states that when she is sitting she does not have any pain but when she stands for too long she has a 4-5 out of 10.  She states that she had breast cancer back in February and is on anastrozole 1 mg daily.  She states her sugar typically goes into the 300s for 3 days after a single cortisone shot.  She is being seen today for

## 2024-07-15 ENCOUNTER — OFFICE VISIT (OUTPATIENT)
Dept: ORTHOPEDIC SURGERY | Age: 74
End: 2024-07-15
Payer: MEDICARE

## 2024-07-15 VITALS — BODY MASS INDEX: 33.99 KG/M2 | WEIGHT: 180 LBS | HEIGHT: 61 IN

## 2024-07-15 DIAGNOSIS — M25.562 ACUTE PAIN OF BOTH KNEES: ICD-10-CM

## 2024-07-15 DIAGNOSIS — M25.561 ACUTE PAIN OF BOTH KNEES: ICD-10-CM

## 2024-07-15 DIAGNOSIS — M22.42 CHONDROMALACIA OF BOTH PATELLAE: ICD-10-CM

## 2024-07-15 DIAGNOSIS — M22.41 CHONDROMALACIA OF BOTH PATELLAE: ICD-10-CM

## 2024-07-15 DIAGNOSIS — M17.0 BILATERAL PRIMARY OSTEOARTHRITIS OF KNEE: Primary | ICD-10-CM

## 2024-07-15 PROCEDURE — 1090F PRES/ABSN URINE INCON ASSESS: CPT | Performed by: FAMILY MEDICINE

## 2024-07-15 PROCEDURE — G8399 PT W/DXA RESULTS DOCUMENT: HCPCS | Performed by: FAMILY MEDICINE

## 2024-07-15 PROCEDURE — 20610 DRAIN/INJ JOINT/BURSA W/O US: CPT | Performed by: FAMILY MEDICINE

## 2024-07-15 PROCEDURE — 1036F TOBACCO NON-USER: CPT | Performed by: FAMILY MEDICINE

## 2024-07-15 PROCEDURE — 99213 OFFICE O/P EST LOW 20 MIN: CPT | Performed by: FAMILY MEDICINE

## 2024-07-15 PROCEDURE — G8417 CALC BMI ABV UP PARAM F/U: HCPCS | Performed by: FAMILY MEDICINE

## 2024-07-15 PROCEDURE — G8428 CUR MEDS NOT DOCUMENT: HCPCS | Performed by: FAMILY MEDICINE

## 2024-07-15 PROCEDURE — 1123F ACP DISCUSS/DSCN MKR DOCD: CPT | Performed by: FAMILY MEDICINE

## 2024-07-15 RX ORDER — HYALURONATE SODIUM 10 MG/ML
40 SYRINGE (ML) INTRAARTICULAR ONCE
Status: COMPLETED | OUTPATIENT
Start: 2024-07-15 | End: 2024-07-15

## 2024-07-15 RX ADMIN — Medication 40 MG: at 13:16

## 2024-07-22 ENCOUNTER — OFFICE VISIT (OUTPATIENT)
Dept: ORTHOPEDIC SURGERY | Age: 74
End: 2024-07-22
Payer: MEDICARE

## 2024-07-22 DIAGNOSIS — M25.561 ACUTE PAIN OF BOTH KNEES: ICD-10-CM

## 2024-07-22 DIAGNOSIS — M22.42 CHONDROMALACIA OF BOTH PATELLAE: ICD-10-CM

## 2024-07-22 DIAGNOSIS — M25.562 ACUTE PAIN OF BOTH KNEES: ICD-10-CM

## 2024-07-22 DIAGNOSIS — M22.41 CHONDROMALACIA OF BOTH PATELLAE: ICD-10-CM

## 2024-07-22 DIAGNOSIS — M17.0 BILATERAL PRIMARY OSTEOARTHRITIS OF KNEE: Primary | ICD-10-CM

## 2024-07-22 PROCEDURE — 20610 DRAIN/INJ JOINT/BURSA W/O US: CPT | Performed by: FAMILY MEDICINE

## 2024-07-22 RX ORDER — HYALURONATE SODIUM 10 MG/ML
40 SYRINGE (ML) INTRAARTICULAR ONCE
Status: COMPLETED | OUTPATIENT
Start: 2024-07-22 | End: 2024-07-22

## 2024-07-22 RX ADMIN — Medication 40 MG: at 13:12

## 2024-07-22 NOTE — PROGRESS NOTES
CC:  FU Knee Osteoarthritis with Viscosupplementation      FU left greater than right knee pain status post fall 11/17/23 with known history of advanced right knee osteoarthritis and more moderate left knee medial compartment osteoarthritis patellofemoral arthropathy with synovitis    History of Present Illness:  Suzi Alas is a 73 y.o. female   Patient presents to office after a visit to urgent care.  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 Emmanuel Anne who is being seen today upon referral from Quintin Molina PA-C for evaluation of an injury to her left greater than right knee after slipping on her cane at home on 11/17/2023.  She states that she fell on November 17 after coming in from the rain.  She attempted to dry her cane on the rug however when she put it on the ceramic tile and tried to take a step forward it went out from under her.  She states that she slammed her knee into the ceramic tile and twisted her ankle on the left.  She states that her ankle hurt at that time but that the knee did not start to hurt until 5 days later.  She states she did not hear any pop, crack, or snap.  She states that her knee did not swell up.  She states that since the initial fall and visit to urgent care she is about 50% better.  She has been icing her knee and taking meloxicam since last Tuesday.  She follows with Dr. Keene usually.  She is interested in getting a cortisone injection for the left knee and gel injections for both knees in the future.  She states that when she is sitting she does not have any pain but when she stands for too long she has a 4-5 out of 10.  She states that she had breast cancer back in February and is on anastrozole 1 mg daily.  She states her sugar typically goes into the 300s for 3 days after a single cortisone shot.  She is being seen today for orthopedic and sports consultation with review of her recent

## 2024-07-29 ENCOUNTER — OFFICE VISIT (OUTPATIENT)
Dept: ORTHOPEDIC SURGERY | Age: 74
End: 2024-07-29
Payer: MEDICARE

## 2024-07-29 DIAGNOSIS — M17.0 BILATERAL PRIMARY OSTEOARTHRITIS OF KNEE: Primary | ICD-10-CM

## 2024-07-29 DIAGNOSIS — M22.41 CHONDROMALACIA OF BOTH PATELLAE: ICD-10-CM

## 2024-07-29 DIAGNOSIS — M25.562 ACUTE PAIN OF BOTH KNEES: ICD-10-CM

## 2024-07-29 DIAGNOSIS — M25.561 ACUTE PAIN OF BOTH KNEES: ICD-10-CM

## 2024-07-29 DIAGNOSIS — M22.42 CHONDROMALACIA OF BOTH PATELLAE: ICD-10-CM

## 2024-07-29 PROCEDURE — 20610 DRAIN/INJ JOINT/BURSA W/O US: CPT | Performed by: FAMILY MEDICINE

## 2024-07-29 RX ORDER — HYALURONATE SODIUM 10 MG/ML
40 SYRINGE (ML) INTRAARTICULAR ONCE
Status: COMPLETED | OUTPATIENT
Start: 2024-07-29 | End: 2024-07-29

## 2024-07-29 RX ADMIN — Medication 40 MG: at 13:15

## 2024-07-30 DIAGNOSIS — M22.41 CHONDROMALACIA OF BOTH PATELLAE: ICD-10-CM

## 2024-07-30 DIAGNOSIS — M17.0 BILATERAL PRIMARY OSTEOARTHRITIS OF KNEE: ICD-10-CM

## 2024-07-30 DIAGNOSIS — M25.562 ACUTE PAIN OF LEFT KNEE: ICD-10-CM

## 2024-07-30 DIAGNOSIS — M22.42 CHONDROMALACIA OF BOTH PATELLAE: ICD-10-CM

## 2024-07-30 DIAGNOSIS — M17.12 LOCALIZED OSTEOARTHRITIS OF LEFT KNEE: ICD-10-CM

## 2024-07-30 RX ORDER — CELECOXIB 200 MG/1
200 CAPSULE ORAL DAILY
Qty: 30 CAPSULE | Refills: 3 | Status: SHIPPED | OUTPATIENT
Start: 2024-07-30

## 2024-10-21 ENCOUNTER — OFFICE VISIT (OUTPATIENT)
Dept: ORTHOPEDIC SURGERY | Age: 74
End: 2024-10-21
Payer: MEDICARE

## 2024-10-21 DIAGNOSIS — M22.42 CHONDROMALACIA OF BOTH PATELLAE: Primary | ICD-10-CM

## 2024-10-21 DIAGNOSIS — M25.562 ACUTE PAIN OF BOTH KNEES: ICD-10-CM

## 2024-10-21 DIAGNOSIS — M25.561 ACUTE PAIN OF BOTH KNEES: ICD-10-CM

## 2024-10-21 DIAGNOSIS — M17.0 BILATERAL PRIMARY OSTEOARTHRITIS OF KNEE: ICD-10-CM

## 2024-10-21 DIAGNOSIS — M22.41 CHONDROMALACIA OF BOTH PATELLAE: Primary | ICD-10-CM

## 2024-10-21 PROCEDURE — G8428 CUR MEDS NOT DOCUMENT: HCPCS | Performed by: FAMILY MEDICINE

## 2024-10-21 PROCEDURE — 20610 DRAIN/INJ JOINT/BURSA W/O US: CPT | Performed by: FAMILY MEDICINE

## 2024-10-21 PROCEDURE — 99213 OFFICE O/P EST LOW 20 MIN: CPT | Performed by: FAMILY MEDICINE

## 2024-10-21 PROCEDURE — 1036F TOBACCO NON-USER: CPT | Performed by: FAMILY MEDICINE

## 2024-10-21 PROCEDURE — G8484 FLU IMMUNIZE NO ADMIN: HCPCS | Performed by: FAMILY MEDICINE

## 2024-10-21 PROCEDURE — 1123F ACP DISCUSS/DSCN MKR DOCD: CPT | Performed by: FAMILY MEDICINE

## 2024-10-21 PROCEDURE — G8417 CALC BMI ABV UP PARAM F/U: HCPCS | Performed by: FAMILY MEDICINE

## 2024-10-21 PROCEDURE — G8399 PT W/DXA RESULTS DOCUMENT: HCPCS | Performed by: FAMILY MEDICINE

## 2024-10-21 PROCEDURE — 1090F PRES/ABSN URINE INCON ASSESS: CPT | Performed by: FAMILY MEDICINE

## 2024-10-21 RX ORDER — BUPIVACAINE HYDROCHLORIDE 2.5 MG/ML
4 INJECTION, SOLUTION INFILTRATION; PERINEURAL ONCE
Status: COMPLETED | OUTPATIENT
Start: 2024-10-21 | End: 2024-10-21

## 2024-10-21 RX ORDER — BETAMETHASONE SODIUM PHOSPHATE AND BETAMETHASONE ACETATE 3; 3 MG/ML; MG/ML
24 INJECTION, SUSPENSION INTRA-ARTICULAR; INTRALESIONAL; INTRAMUSCULAR; SOFT TISSUE ONCE
Status: COMPLETED | OUTPATIENT
Start: 2024-10-21 | End: 2024-10-21

## 2024-10-21 RX ADMIN — BUPIVACAINE HYDROCHLORIDE 10 MG: 2.5 INJECTION, SOLUTION INFILTRATION; PERINEURAL at 10:14

## 2024-10-21 RX ADMIN — BETAMETHASONE SODIUM PHOSPHATE AND BETAMETHASONE ACETATE 24 MG: 3; 3 INJECTION, SUSPENSION INTRA-ARTICULAR; INTRALESIONAL; INTRAMUSCULAR; SOFT TISSUE at 10:13

## 2024-10-21 RX ADMIN — Medication 2 ML: at 10:15

## 2024-10-21 NOTE — PROGRESS NOTES
arthropathy.  We have completed viscosupplementation on 12/27/2023 and she did receive a steroid injection by Dr. Downey on 3/7/2024.  This did help her substantially.  She is now having pain range between a 5-8 out of 10 which actually interestingly is more problematic on the left as opposed to the right.  She is continue with her exercise program and Celebrex and does utilize her brace episodically but is also using a cane.  Pain positional changes and walking primarily but fortunately she does not have any substantial rest or night pain.  She is hopeful to put off knee arthroplasty as long as she can.    We last saw Suzi in the office on 7/22/2024 and was continued on viscosupplementation for her end-stage right knee medial compartment osteoarthritis with patellofemoral arthropathy.  She does have more moderate medial compartment arthropathy of the left with bilateral patellofemoral arthropathy.  Overall she does continue to do much better.  She is 85+ percent improved.  She is having at this point minimal pain 1-2 out of 10.  She is having less pain with getting up from a seated position walking and doing stairs.  Denies locking catching true instability symptoms and will occasionally utilize her cane and brace.  She is continue with her Celebrex and is here today for her final Euflexxa injection bilaterally.    We last saw Suzi in the office on 7/29/2024 we finished up viscosupplementation with Euflexxa to her knees bilaterally.  She presents back today stating that she had been doing reasonably well but has become a little bit more sore.  Denies history of injury fall or trauma.  They are going to be going on a cruise.  She is pretty good about keeping up with her exercise program and is continue with her Celebrex 200 mg daily.  She would like to avoid knee arthroplasty as long as she can and denies locking catching or true instability symptoms.    Medical History     Patient's medications, allergies, past

## 2024-12-14 DIAGNOSIS — M22.41 CHONDROMALACIA OF BOTH PATELLAE: ICD-10-CM

## 2024-12-14 DIAGNOSIS — M22.42 CHONDROMALACIA OF BOTH PATELLAE: ICD-10-CM

## 2024-12-14 DIAGNOSIS — M17.0 BILATERAL PRIMARY OSTEOARTHRITIS OF KNEE: ICD-10-CM

## 2024-12-14 DIAGNOSIS — M17.12 LOCALIZED OSTEOARTHRITIS OF LEFT KNEE: ICD-10-CM

## 2024-12-14 DIAGNOSIS — M25.562 ACUTE PAIN OF LEFT KNEE: ICD-10-CM

## 2024-12-16 RX ORDER — CELECOXIB 200 MG/1
200 CAPSULE ORAL DAILY
Qty: 30 CAPSULE | Refills: 3 | Status: SHIPPED | OUTPATIENT
Start: 2024-12-16

## 2025-01-17 DIAGNOSIS — M22.41 CHONDROMALACIA OF BOTH PATELLAE: ICD-10-CM

## 2025-01-17 DIAGNOSIS — M25.561 ACUTE PAIN OF BOTH KNEES: ICD-10-CM

## 2025-01-17 DIAGNOSIS — M17.0 BILATERAL PRIMARY OSTEOARTHRITIS OF KNEE: Primary | ICD-10-CM

## 2025-01-17 DIAGNOSIS — M22.42 CHONDROMALACIA OF BOTH PATELLAE: ICD-10-CM

## 2025-01-17 DIAGNOSIS — M25.562 ACUTE PAIN OF BOTH KNEES: ICD-10-CM

## 2025-01-20 SDOH — HEALTH STABILITY: PHYSICAL HEALTH: ON AVERAGE, HOW MANY DAYS PER WEEK DO YOU ENGAGE IN MODERATE TO STRENUOUS EXERCISE (LIKE A BRISK WALK)?: 6 DAYS

## 2025-01-20 SDOH — HEALTH STABILITY: PHYSICAL HEALTH: ON AVERAGE, HOW MANY MINUTES DO YOU ENGAGE IN EXERCISE AT THIS LEVEL?: 20 MIN

## 2025-01-23 ENCOUNTER — PREP FOR PROCEDURE (OUTPATIENT)
Dept: ORTHOPEDIC SURGERY | Age: 75
End: 2025-01-23

## 2025-01-23 ENCOUNTER — OFFICE VISIT (OUTPATIENT)
Dept: ORTHOPEDIC SURGERY | Age: 75
End: 2025-01-23
Payer: MEDICARE

## 2025-01-23 VITALS — WEIGHT: 180 LBS | BODY MASS INDEX: 33.99 KG/M2 | HEIGHT: 61 IN

## 2025-01-23 DIAGNOSIS — M17.11 PRIMARY OSTEOARTHRITIS OF RIGHT KNEE: ICD-10-CM

## 2025-01-23 DIAGNOSIS — Z01.818 PRE-OP TESTING: Primary | ICD-10-CM

## 2025-01-23 DIAGNOSIS — Z01.818 PRE-OP TESTING: ICD-10-CM

## 2025-01-23 LAB
ABO + RH BLD: NORMAL
ALBUMIN SERPL-MCNC: 4.3 G/DL (ref 3.4–5)
ANION GAP SERPL CALCULATED.3IONS-SCNC: 11 MMOL/L (ref 3–16)
APTT BLD: 28.8 SEC (ref 22.1–36.4)
BASOPHILS # BLD: 0.1 K/UL (ref 0–0.2)
BASOPHILS NFR BLD: 0.9 %
BILIRUB UR QL STRIP.AUTO: NEGATIVE
BLD GP AB SCN SERPL QL: NORMAL
BUN SERPL-MCNC: 21 MG/DL (ref 7–20)
CALCIUM SERPL-MCNC: 10.5 MG/DL (ref 8.3–10.6)
CHLORIDE SERPL-SCNC: 105 MMOL/L (ref 99–110)
CLARITY UR: CLEAR
CO2 SERPL-SCNC: 27 MMOL/L (ref 21–32)
COLOR UR: YELLOW
CREAT SERPL-MCNC: 0.7 MG/DL (ref 0.6–1.2)
DEPRECATED RDW RBC AUTO: 14.2 % (ref 12.4–15.4)
EOSINOPHIL # BLD: 0.2 K/UL (ref 0–0.6)
EOSINOPHIL NFR BLD: 2.2 %
GFR SERPLBLD CREATININE-BSD FMLA CKD-EPI: 89 ML/MIN/{1.73_M2}
GLUCOSE SERPL-MCNC: 142 MG/DL (ref 70–99)
GLUCOSE UR STRIP.AUTO-MCNC: NEGATIVE MG/DL
HCT VFR BLD AUTO: 38.7 % (ref 36–48)
HGB BLD-MCNC: 13.2 G/DL (ref 12–16)
HGB UR QL STRIP.AUTO: NEGATIVE
INR PPP: 0.97 (ref 0.85–1.15)
KETONES UR STRIP.AUTO-MCNC: NEGATIVE MG/DL
LEUKOCYTE ESTERASE UR QL STRIP.AUTO: NEGATIVE
LYMPHOCYTES # BLD: 2.3 K/UL (ref 1–5.1)
LYMPHOCYTES NFR BLD: 23.9 %
MCH RBC QN AUTO: 29.9 PG (ref 26–34)
MCHC RBC AUTO-ENTMCNC: 34.1 G/DL (ref 31–36)
MCV RBC AUTO: 87.8 FL (ref 80–100)
MONOCYTES # BLD: 0.7 K/UL (ref 0–1.3)
MONOCYTES NFR BLD: 7.4 %
NEUTROPHILS # BLD: 6.3 K/UL (ref 1.7–7.7)
NEUTROPHILS NFR BLD: 65.6 %
NITRITE UR QL STRIP.AUTO: NEGATIVE
PH UR STRIP.AUTO: 5.5 [PH] (ref 5–8)
PLATELET # BLD AUTO: 178 K/UL (ref 135–450)
PMV BLD AUTO: 10.6 FL (ref 5–10.5)
POTASSIUM SERPL-SCNC: 4.2 MMOL/L (ref 3.5–5.1)
PROT UR STRIP.AUTO-MCNC: NEGATIVE MG/DL
PROTHROMBIN TIME: 13.1 SEC (ref 11.9–14.9)
RBC # BLD AUTO: 4.4 M/UL (ref 4–5.2)
SODIUM SERPL-SCNC: 143 MMOL/L (ref 136–145)
SP GR UR STRIP.AUTO: 1.02 (ref 1–1.03)
TRANSFERRIN SERPL-MCNC: 262 MG/DL (ref 200–360)
UA DIPSTICK W REFLEX MICRO PNL UR: NORMAL
URN SPEC COLLECT METH UR: NORMAL
UROBILINOGEN UR STRIP-ACNC: 0.2 E.U./DL
WBC # BLD AUTO: 9.7 K/UL (ref 4–11)

## 2025-01-23 PROCEDURE — 1123F ACP DISCUSS/DSCN MKR DOCD: CPT | Performed by: ORTHOPAEDIC SURGERY

## 2025-01-23 PROCEDURE — 1159F MED LIST DOCD IN RCRD: CPT | Performed by: ORTHOPAEDIC SURGERY

## 2025-01-23 PROCEDURE — 1090F PRES/ABSN URINE INCON ASSESS: CPT | Performed by: ORTHOPAEDIC SURGERY

## 2025-01-23 PROCEDURE — G8417 CALC BMI ABV UP PARAM F/U: HCPCS | Performed by: ORTHOPAEDIC SURGERY

## 2025-01-23 PROCEDURE — G8399 PT W/DXA RESULTS DOCUMENT: HCPCS | Performed by: ORTHOPAEDIC SURGERY

## 2025-01-23 PROCEDURE — G8427 DOCREV CUR MEDS BY ELIG CLIN: HCPCS | Performed by: ORTHOPAEDIC SURGERY

## 2025-01-23 PROCEDURE — 1036F TOBACCO NON-USER: CPT | Performed by: ORTHOPAEDIC SURGERY

## 2025-01-23 PROCEDURE — 99215 OFFICE O/P EST HI 40 MIN: CPT | Performed by: ORTHOPAEDIC SURGERY

## 2025-01-23 RX ORDER — MUPIROCIN 20 MG/G
OINTMENT TOPICAL
Qty: 1 G | Refills: 0 | Status: SHIPPED | OUTPATIENT
Start: 2025-01-23

## 2025-01-23 RX ORDER — ANASTROZOLE 1 MG/1
1 TABLET ORAL DAILY
COMMUNITY

## 2025-01-23 NOTE — PROGRESS NOTES
Dr Killian Downey      Date /Time 1/23/2025       2:09 PM EST  Name Suzi Alas             1950   Location  [facility]  MRN [id number]                Chief Complaint   Patient presents with    Follow-up     Ck Right Knee         History of Present Illness  Suzi Alas is a 75 y.o. female who presents with  bilateral knee pain, .    Patient presents to the office today for a follow-up visit.  Patient being treated for bilateral knee osteoarthritis.  She has had multiple cortisone injections and viscosupplementation injections in the past.  Her pain has returned and is more significant.  Today her right knee is more painful than her left.  Patient has already been through physical therapy in the past.  She does continue her home exercises on a daily basis.    Previous history: Patient presents the office today for follow-up visit.  Patient being treated for bilateral knee osteoarthritis.  At her last visit her left knee was more symptomatic.  We were concerned about a possible stress fracture.  We did order her an MRI.  She is here to review results      Previous history: Patient presents the office today for a new problem to me.  Patient previously saw Dr. Hackett.  Patient is being treated for bilateral knee osteoarthritis.  Patient has received cortisone injections in the past.  Patient then completed a series of viscosupplementation injections bilateral knee on December 27, 2023.  Patient has also had a cortisone injection done on December 11, 2023.  She has been having increased pain and swelling without new injury or trauma  .  Previously patient has had a right knee subchondral plasty done by Dr. Alex Keene.  Her left knee is more painful than her right    Past History  Past Medical History:   Diagnosis Date    Acid reflux     Arthritis     Diabetes mellitus (HCC)     Heart murmur     Hyperlipidemia     Hypertension     Kidney stone     PONV (postoperative nausea and vomiting)

## 2025-01-24 LAB
BACTERIA UR CULT: NORMAL
EST. AVERAGE GLUCOSE BLD GHB EST-MCNC: 131.2 MG/DL
HBA1C MFR BLD: 6.2 %

## 2025-01-26 LAB — MRSA SPEC QL CULT: NORMAL

## 2025-01-29 ENCOUNTER — HOSPITAL ENCOUNTER (OUTPATIENT)
Dept: PHYSICAL THERAPY | Age: 75
Setting detail: THERAPIES SERIES
Discharge: HOME | End: 2025-01-29
Attending: ORTHOPAEDIC SURGERY
Payer: MEDICARE

## 2025-01-29 DIAGNOSIS — R26.89 ANTALGIC GAIT: ICD-10-CM

## 2025-01-29 DIAGNOSIS — M62.81 MUSCLE WEAKNESS OF UPPER EXTREMITY: ICD-10-CM

## 2025-01-29 DIAGNOSIS — M25.561 RIGHT KNEE PAIN, UNSPECIFIED CHRONICITY: Primary | ICD-10-CM

## 2025-01-29 PROCEDURE — 97161 PT EVAL LOW COMPLEX 20 MIN: CPT

## 2025-01-29 PROCEDURE — 97110 THERAPEUTIC EXERCISES: CPT

## 2025-01-29 PROCEDURE — 97530 THERAPEUTIC ACTIVITIES: CPT

## 2025-01-29 NOTE — PLAN OF CARE
[facility] - Outpatient Rehabilitation and Therapy: [address] [place] [address] office: [phone] fax: [phone]    Physical Therapy Initial Evaluation Certification      Dear Killian Downey MD,    We had the pleasure of evaluating the following patient for physical therapy services at [facility].  A summary of our findings can be found in the initial assessment below.  This includes our plan of care.  If you have any questions or concerns regarding these findings, please do not hesitate to contact me at the office phone number listed above.  Thank you for the referral.     Physician Signature:_______________________________Date:__________________  By signing above (or electronic signature), therapist’s plan is approved by physician       Physical Therapy: TREATMENT/PROGRESS NOTE   Patient: Suzi Alas (75 y.o. female)   Examination Date: 2025   :  1950 MRN: [id number]   Visit #: 1   Insurance Allowable Auth Needed   Med Nec  KX Modifier visit 15 []Yes    []No    Insurance: Payor: MEDICARE / Plan: MEDICARE PART A AND B / Product Type: *No Product type* /   Insurance ID: [id number] - (Medicare)  Secondary Insurance (if applicable): [employer]   Treatment Diagnosis:     ICD-10-CM    1. Right knee pain, unspecified chronicity  M25.561       2. Muscle weakness of upper extremity  M62.81       3. Antalgic gait  R26.89          Medical Diagnosis:  Primary osteoarthritis of right knee [M17.11] Pre-Hab Eval   Referring Physician: Killian Downey MD  PCP: Emmanuel Mccauley DO     Plan of care signed (Y/N):     Date of Patient follow up with Physician:      Plan of Care Report: EVAL today  POC update due: (10 visits /OR AUTH LIMITS, whichever is less)  2025                                             Medical History:  Comorbidities:  Cancer/Tumor  Diabetes (Type I or II)  Hypertension  Osteoarthritis  Prior Surgeries: Knee Sx  (1) Other Medications/None

## 2025-02-03 NOTE — PROGRESS NOTES
Suzi Alas    Age 75 y.o.    female    1950    MRN [id number]    2/24/2025  Arrival Time_____________  OR Time____________146 Min     Procedure(s):  RIGHT TOTAL KNEE ARTHROPLASTY            ISA      **LATEX SENSITIVE** NOTE:  ADDUCTOR CANAL BLOCK                      General   Surgeon(s):  Killian Downey, MD      DAY ADMIT ___  SDS/OP ___  OUTPT IN BED ___        Phone [phone] (home)                  PCP _____________________ Phone_________________ Epic ( ) Epic CE ( ) Appt ________    NOTES: _________________________________________ Consult/Cardio _______________    ____________________________________________________________________________    ____________________________________________________________________________  PAT APPT DATE:________ TIME: ________  FAXED QAD: _______  (__) H&P w/ Hospitalist    (__) PAT orders in EPIC    (__) Meet with PAT nurse  __________________________________________________________________________  Preop Nurse phone screen complete: _____________  (__) CBC     (__) W/ DIFF ___________  (__) CT CHEST  __________   (__) Hgb A1C    ___________  (__) CHEST X RAY   __________  (__) LIPID PROFILE  ___________  (__) EKG   __________  (__) PT-INR / APTT  ___________  (__) PFT's   __________  (__) BMP   ___________  (__) CAROTIDS  __________  (__) CMP   ___________  (__) VEIN MAPPING  __________  (__) U/A   ___________  ( X ) HISTORY & PHYSICAL __________  (__) URINE C & S  ___________  (__) CARDIAC CLEARANCE __________  (__) U/A W/ FLEX  ___________  (__) PULM. CLEARANCE __________  (__) SERUM PREGNANCY ___________  (__) Preop Orders in EPIC __________  (__) TYPE & SCREEN __________repeat ( ) (__)  __________________ __________  (__) Albumin   ___________  (__)  __________________ __________  (__) TRANSFERRIN  ___________  (__)  __________________ __________  (__) LIVER PROFILE  ___________  (__) URINE PREG DOS __________  (__) MRSA NASAL SWAB ___________  (__) BLOOD

## 2025-02-04 DIAGNOSIS — M22.41 CHONDROMALACIA OF BOTH PATELLAE: ICD-10-CM

## 2025-02-04 DIAGNOSIS — M25.562 ACUTE PAIN OF BOTH KNEES: ICD-10-CM

## 2025-02-04 DIAGNOSIS — M25.561 ACUTE PAIN OF BOTH KNEES: ICD-10-CM

## 2025-02-04 DIAGNOSIS — M17.0 BILATERAL PRIMARY OSTEOARTHRITIS OF KNEE: Primary | ICD-10-CM

## 2025-02-04 DIAGNOSIS — M22.42 CHONDROMALACIA OF BOTH PATELLAE: ICD-10-CM

## 2025-02-04 RX ORDER — CELECOXIB 200 MG/1
200 CAPSULE ORAL DAILY
Qty: 30 CAPSULE | Refills: 1 | Status: SHIPPED | OUTPATIENT
Start: 2025-02-04

## 2025-02-17 NOTE — PROGRESS NOTES
Surgery Date and Time:  Monday 2/24/25    Arrival Time:  Surgeon office will call Fri. 2/21 with arrival time      __X__Do not eat or drink anything after Midnight the night before the surgery, except for instructions below.    NO gum, mints, candy or ice chips the day of surgery.        __X____ Carbo-loading or ENHANCED RECOVERY instructions will be given to select patients.  Please do the following:    The evening before your surgery (after dinner before midnight) drink 40 ounces (2 - 20 ounce bottles) of Gatorade.      If you are diabetic use sugar free. The morning of surgery drink two (2) - 20 ounce bottle water. This needs to be finished      2 hours prior to your surgery start time.         Only take the following medications with a small sip of water the morning of surgery:    Levothyroxine only       No Glimepride morning of surgery & hold Saxagliptin 3 days prior - last dose 2/20 pm dose       Hold the following medications (per anesthesia or surgeon request):    Lisinopril/HCTZ - hold 24 hrs prior   Last Dose:  2/22 12 noon        Aspirin, Ibuprofen, Advil, Naproxen, Vitamin E and other Anti-inflammatory products and supplements should be stopped       for 5 -7days before surgery or as directed by your physician.  Hold fish oil - last dose 2/16               - Do not smoke or vape, and do not drink any alcoholic beverages 24 hours prior to surgery, this includes NA Beer. Refrain from using     any recreational drugs, including non-prescribed prescription drugs.     -You may brush your teeth and gargle the morning of surgery.  DO NOT SWALLOW WATER.    -You MUST plan for a responsible adult to stay on site while you are here and take you home after your surgery. You will not be allowed                to leave alone or drive yourself home. It is requested someone stay with you the first 24 hrs. Your surgery will be cancelled if you do not                have a ride home with a responsible adult.     -Please wear simple, loose-fitting clothing to the hospital. Do not bring valuables (money, credit cards, checkbooks, etc.)                Do not wear any makeup (including no eye makeup) and no nail polish if applicable.             - DO NOT wear any jewelry or body piercings day of surgery.  All body piercing jewelry must be removed.             - If you have dentures they will be removed before going to the OR; we will provide a container.  If you wear contact lenses                or glasses they will be removed, bring a case for them or wear glasses day of surgery.             - Please see your family doctor/pediatrician for a Preop History & Physical and/or concerning medications within 30               days of the surgery date. Non-[facility] physicians can fax H&P/test results to [phone]. You may need cardiac clearance               if you see a cardiologist, check with PCP or surgeon.              -If you have a Living Will and Durable Power of  for Healthcare, please bring in a copy to be scanned at registration.   -Notify your Surgeon if you develop any illness between now and the surgery date, cough, cold, fever, sore throat,     nausea, vomiting, etc.  Please notify your  surgeon if you experience dizziness, shortness of breath or blurred vision     between now & the time of your surgery.              -DO NOT shave your operative site less than 4 days prior to surgery. For face & neck surgery, men may use an electric                razor up to 2 days prior to surgery.   -To help prevent infection, change your sheets the night before surgery. Shower the night before and morning of surgery    using antibacterial soap (Dial or Safeguard) or Hibiclens soap as instructed by your surgeon.     - Do not apply lotion after shower or day of surgery.              -To provide excellent care visitors will be limited to two per room at any given time.              -Please bring your picture ID and insurance

## 2025-02-17 NOTE — PROGRESS NOTES
Pt instructed to drink up to 40 oz of sugar free Gatorade type drink the evening prior to surgery.   Pt informed they can have up to 40 oz of water and that it must be completed 2 hours prior to scheduled surgery

## 2025-02-19 ENCOUNTER — TELEPHONE (OUTPATIENT)
Dept: ORTHOPEDIC SURGERY | Age: 75
End: 2025-02-19

## 2025-02-19 NOTE — TELEPHONE ENCOUNTER
ORTHOPAEDIC NURSE NAVIGATOR SUMMARY NOTE      Anticipated Date of Surgery: 2/24/25    Recieved Pre-Op Education: yes   In person class:no  Pt used educational link:yes   Pt completed pre and post test to measure learning:yes    If pt did not complete either, why not?  N/A    ERAS protocol explained to pt. Pt does not have the following medical conditions:  -Diabetes  -Gastroparesis  -CHF  -Fluid restricted diet  -Known difficult airway  Pt instructed to drink up to 40 oz of Gatorade type drink the evening prior to surgery.   Pt informed they can have up to 40 oz of water and that it must be completed 2 hours prior to scheduled surgery.  Pt verbalized understanding.     PCP: Emmanuel Mccauley DO   Phone #: [phone]    Date of PCP Visit for H&P: 1/31/25    Any Noted Concerns from PCP prior to surgery:  No   If Yes, what concerns?:    IS THE PATIENT IN A PAIN MANAGEMENT PROGRAM?:   Not Applicable     Review of Past Medical History Reveals History of:      Critical Lab Values:   Hgb/Hct:   Hemoglobin (g/dL)   Date Value   01/23/2025 13.2   /  Hematocrit (%)   Date Value   01/23/2025 38.7      HgbA1C:    Lab Results   Component Value Date    LABA1C 6.2 01/23/2025      Albumin:  No results found for: \"LABALBU\"   BUN/Cr:   BUN (mg/dL)   Date Value   01/23/2025 21 (H)   /  Creatinine (mg/dL)   Date Value   01/23/2025 0.7      BMI:    BMI Readings from Last 1 Encounters:   01/23/25 34.01 kg/m²        Coronary Artery Disease/HTN/CHF History: Yes- HTN       -On any anticoagulation-none       Diabetes History: Yes    Most Recent HgbA1C: 6.2    PCP or Endocrine Recommendations: N/A    Nutritionist/Dietician Consult Scheduled: N/A    Final Plan For Diabetic Control: N/A   Pulmonary: COPD/Emphysema/ Use of home oxygen: none     Alcohol use:        DVT Risk Stratification:  Low       -Smoking history or use of estrogen-         BMI Greater than 40 at time of scheduling?: No       Additional Medical Concerns:         Discharge

## 2025-02-20 ENCOUNTER — TELEPHONE (OUTPATIENT)
Dept: ORTHOPEDIC SURGERY | Age: 75
End: 2025-02-20

## 2025-02-21 ENCOUNTER — ANESTHESIA EVENT (OUTPATIENT)
Dept: OPERATING ROOM | Age: 75
End: 2025-02-21
Payer: MEDICARE

## 2025-02-24 ENCOUNTER — HOSPITAL ENCOUNTER (OUTPATIENT)
Age: 75
Discharge: HOME | End: 2025-02-24
Attending: ORTHOPAEDIC SURGERY | Admitting: ORTHOPAEDIC SURGERY
Payer: MEDICARE

## 2025-02-24 ENCOUNTER — APPOINTMENT (OUTPATIENT)
Dept: GENERAL RADIOLOGY | Age: 75
End: 2025-02-24
Attending: ORTHOPAEDIC SURGERY
Payer: MEDICARE

## 2025-02-24 ENCOUNTER — ANESTHESIA (OUTPATIENT)
Dept: OPERATING ROOM | Age: 75
End: 2025-02-24
Payer: MEDICARE

## 2025-02-24 VITALS
HEIGHT: 61 IN | HEART RATE: 72 BPM | TEMPERATURE: 98.2 F | RESPIRATION RATE: 36 BRPM | WEIGHT: 177 LBS | OXYGEN SATURATION: 100 % | DIASTOLIC BLOOD PRESSURE: 69 MMHG | BODY MASS INDEX: 33.42 KG/M2 | SYSTOLIC BLOOD PRESSURE: 116 MMHG

## 2025-02-24 DIAGNOSIS — M17.11 PRIMARY OSTEOARTHRITIS OF RIGHT KNEE: ICD-10-CM

## 2025-02-24 DIAGNOSIS — M17.11 PRIMARY LOCALIZED OSTEOARTHRITIS OF RIGHT KNEE: Primary | ICD-10-CM

## 2025-02-24 DIAGNOSIS — Z96.651 S/P TOTAL KNEE ARTHROPLASTY, RIGHT: Primary | ICD-10-CM

## 2025-02-24 LAB
ABO + RH BLD: NORMAL
BLD GP AB SCN SERPL QL: NORMAL
GLUCOSE BLD-MCNC: 158 MG/DL (ref 70–99)
PERFORMED ON: ABNORMAL

## 2025-02-24 PROCEDURE — 6360000002 HC RX W HCPCS: Performed by: ORTHOPAEDIC SURGERY

## 2025-02-24 PROCEDURE — 2500000003 HC RX 250 WO HCPCS: Performed by: ORTHOPAEDIC SURGERY

## 2025-02-24 PROCEDURE — 97116 GAIT TRAINING THERAPY: CPT

## 2025-02-24 PROCEDURE — 97110 THERAPEUTIC EXERCISES: CPT

## 2025-02-24 PROCEDURE — 3700000000 HC ANESTHESIA ATTENDED CARE: Performed by: ORTHOPAEDIC SURGERY

## 2025-02-24 PROCEDURE — 2580000003 HC RX 258

## 2025-02-24 PROCEDURE — 86901 BLOOD TYPING SEROLOGIC RH(D): CPT

## 2025-02-24 PROCEDURE — 2580000003 HC RX 258: Performed by: STUDENT IN AN ORGANIZED HEALTH CARE EDUCATION/TRAINING PROGRAM

## 2025-02-24 PROCEDURE — 2500000003 HC RX 250 WO HCPCS: Performed by: PHYSICIAN ASSISTANT

## 2025-02-24 PROCEDURE — 97162 PT EVAL MOD COMPLEX 30 MIN: CPT

## 2025-02-24 PROCEDURE — 2500000003 HC RX 250 WO HCPCS

## 2025-02-24 PROCEDURE — 6370000000 HC RX 637 (ALT 250 FOR IP): Performed by: ANESTHESIOLOGY

## 2025-02-24 PROCEDURE — 7100000000 HC PACU RECOVERY - FIRST 15 MIN: Performed by: ORTHOPAEDIC SURGERY

## 2025-02-24 PROCEDURE — 6360000002 HC RX W HCPCS: Performed by: STUDENT IN AN ORGANIZED HEALTH CARE EDUCATION/TRAINING PROGRAM

## 2025-02-24 PROCEDURE — C1776 JOINT DEVICE (IMPLANTABLE): HCPCS | Performed by: ORTHOPAEDIC SURGERY

## 2025-02-24 PROCEDURE — 6360000002 HC RX W HCPCS

## 2025-02-24 PROCEDURE — 3600000004 HC SURGERY LEVEL 4 BASE: Performed by: ORTHOPAEDIC SURGERY

## 2025-02-24 PROCEDURE — 6360000002 HC RX W HCPCS: Performed by: PHYSICIAN ASSISTANT

## 2025-02-24 PROCEDURE — 86850 RBC ANTIBODY SCREEN: CPT

## 2025-02-24 PROCEDURE — C1713 ANCHOR/SCREW BN/BN,TIS/BN: HCPCS | Performed by: ORTHOPAEDIC SURGERY

## 2025-02-24 PROCEDURE — 7100000010 HC PHASE II RECOVERY - FIRST 15 MIN: Performed by: ORTHOPAEDIC SURGERY

## 2025-02-24 PROCEDURE — 64447 NJX AA&/STRD FEMORAL NRV IMG: CPT | Performed by: ANESTHESIOLOGY

## 2025-02-24 PROCEDURE — 3700000001 HC ADD 15 MINUTES (ANESTHESIA): Performed by: ORTHOPAEDIC SURGERY

## 2025-02-24 PROCEDURE — 7100000001 HC PACU RECOVERY - ADDTL 15 MIN: Performed by: ORTHOPAEDIC SURGERY

## 2025-02-24 PROCEDURE — 2580000003 HC RX 258: Performed by: ORTHOPAEDIC SURGERY

## 2025-02-24 PROCEDURE — 2720000010 HC SURG SUPPLY STERILE: Performed by: ORTHOPAEDIC SURGERY

## 2025-02-24 PROCEDURE — 6360000002 HC RX W HCPCS: Performed by: ANESTHESIOLOGY

## 2025-02-24 PROCEDURE — 27447 TOTAL KNEE ARTHROPLASTY: CPT | Performed by: ORTHOPAEDIC SURGERY

## 2025-02-24 PROCEDURE — 2709999900 HC NON-CHARGEABLE SUPPLY: Performed by: ORTHOPAEDIC SURGERY

## 2025-02-24 PROCEDURE — 86900 BLOOD TYPING SEROLOGIC ABO: CPT

## 2025-02-24 PROCEDURE — 73560 X-RAY EXAM OF KNEE 1 OR 2: CPT

## 2025-02-24 PROCEDURE — 7100000011 HC PHASE II RECOVERY - ADDTL 15 MIN: Performed by: ORTHOPAEDIC SURGERY

## 2025-02-24 PROCEDURE — 2580000003 HC RX 258: Performed by: PHYSICIAN ASSISTANT

## 2025-02-24 PROCEDURE — 97530 THERAPEUTIC ACTIVITIES: CPT

## 2025-02-24 PROCEDURE — 3600000014 HC SURGERY LEVEL 4 ADDTL 15MIN: Performed by: ORTHOPAEDIC SURGERY

## 2025-02-24 DEVICE — PSN MC VE ASF R 10MM 6-7/CD: Type: IMPLANTABLE DEVICE | Site: KNEE | Status: FUNCTIONAL

## 2025-02-24 DEVICE — IMPLANTABLE DEVICE
Type: IMPLANTABLE DEVICE | Site: KNEE | Status: FUNCTIONAL
Brand: PERSONA®

## 2025-02-24 DEVICE — DUP USE 333576 IMPL KNEE PSN TIB STM 5 DEG SZ D R: Type: IMPLANTABLE DEVICE | Site: KNEE | Status: FUNCTIONAL

## 2025-02-24 DEVICE — CEMENT BNE 20ML 40GM FULL DOSE PMMA W/O ANTIBIO M VISC: Type: IMPLANTABLE DEVICE | Site: KNEE | Status: FUNCTIONAL

## 2025-02-24 DEVICE — IMPLANTABLE DEVICE
Type: IMPLANTABLE DEVICE | Site: KNEE | Status: FUNCTIONAL
Brand: PERSONA® VIVACIT-E®

## 2025-02-24 DEVICE — CEMENT BNE 20ML 41GM FULL DOSE PMMA W/ TOBRA M VISC RADPQ: Type: IMPLANTABLE DEVICE | Site: KNEE | Status: FUNCTIONAL

## 2025-02-24 RX ORDER — MIDAZOLAM HYDROCHLORIDE 1 MG/ML
2 INJECTION, SOLUTION INTRAMUSCULAR; INTRAVENOUS
Status: DISCONTINUED | OUTPATIENT
Start: 2025-02-24 | End: 2025-02-24 | Stop reason: HOSPADM

## 2025-02-24 RX ORDER — OXYCODONE HYDROCHLORIDE 5 MG/1
5 TABLET ORAL PRN
Status: DISCONTINUED | OUTPATIENT
Start: 2025-02-24 | End: 2025-02-24 | Stop reason: HOSPADM

## 2025-02-24 RX ORDER — SODIUM CHLORIDE 0.9 % (FLUSH) 0.9 %
5-40 SYRINGE (ML) INJECTION EVERY 12 HOURS SCHEDULED
Status: DISCONTINUED | OUTPATIENT
Start: 2025-02-24 | End: 2025-02-24 | Stop reason: HOSPADM

## 2025-02-24 RX ORDER — INSULIN LISPRO 100 [IU]/ML
0.08 INJECTION, SOLUTION INTRAVENOUS; SUBCUTANEOUS
Status: DISCONTINUED | OUTPATIENT
Start: 2025-02-24 | End: 2025-02-24 | Stop reason: HOSPADM

## 2025-02-24 RX ORDER — BUPIVACAINE HYDROCHLORIDE 5 MG/ML
INJECTION, SOLUTION EPIDURAL; INTRACAUDAL
Status: COMPLETED | OUTPATIENT
Start: 2025-02-24 | End: 2025-02-24

## 2025-02-24 RX ORDER — OXYCODONE HYDROCHLORIDE 5 MG/1
10 TABLET ORAL PRN
Status: DISCONTINUED | OUTPATIENT
Start: 2025-02-24 | End: 2025-02-24 | Stop reason: HOSPADM

## 2025-02-24 RX ORDER — VANCOMYCIN HYDROCHLORIDE 1 G/20ML
INJECTION, POWDER, LYOPHILIZED, FOR SOLUTION INTRAVENOUS PRN
Status: DISCONTINUED | OUTPATIENT
Start: 2025-02-24 | End: 2025-02-24 | Stop reason: ALTCHOICE

## 2025-02-24 RX ORDER — ONDANSETRON 2 MG/ML
4 INJECTION INTRAMUSCULAR; INTRAVENOUS EVERY 30 MIN PRN
Status: DISCONTINUED | OUTPATIENT
Start: 2025-02-24 | End: 2025-02-24 | Stop reason: HOSPADM

## 2025-02-24 RX ORDER — MORPHINE SULFATE 2 MG/ML
2 INJECTION, SOLUTION INTRAMUSCULAR; INTRAVENOUS
Status: CANCELLED | OUTPATIENT
Start: 2025-02-24

## 2025-02-24 RX ORDER — SODIUM CHLORIDE 0.9 % (FLUSH) 0.9 %
5-40 SYRINGE (ML) INJECTION PRN
Status: DISCONTINUED | OUTPATIENT
Start: 2025-02-24 | End: 2025-02-24 | Stop reason: HOSPADM

## 2025-02-24 RX ORDER — DEXTROSE MONOHYDRATE 100 MG/ML
INJECTION, SOLUTION INTRAVENOUS CONTINUOUS PRN
Status: DISCONTINUED | OUTPATIENT
Start: 2025-02-24 | End: 2025-02-24 | Stop reason: HOSPADM

## 2025-02-24 RX ORDER — MELOXICAM 7.5 MG/1
3.75 TABLET ORAL DAILY
Status: CANCELLED | OUTPATIENT
Start: 2025-02-24 | End: 2025-02-27

## 2025-02-24 RX ORDER — ACETAMINOPHEN 500 MG
1000 TABLET ORAL ONCE
Status: COMPLETED | OUTPATIENT
Start: 2025-02-24 | End: 2025-02-24

## 2025-02-24 RX ORDER — FENTANYL CITRATE 50 UG/ML
INJECTION, SOLUTION INTRAMUSCULAR; INTRAVENOUS
Status: COMPLETED | OUTPATIENT
Start: 2025-02-24 | End: 2025-02-24

## 2025-02-24 RX ORDER — ONDANSETRON 4 MG/1
4 TABLET, FILM COATED ORAL 3 TIMES DAILY PRN
Qty: 15 TABLET | Refills: 0 | Status: SHIPPED | OUTPATIENT
Start: 2025-02-24

## 2025-02-24 RX ORDER — DEXAMETHASONE SODIUM PHOSPHATE 4 MG/ML
INJECTION, SOLUTION INTRA-ARTICULAR; INTRALESIONAL; INTRAMUSCULAR; INTRAVENOUS; SOFT TISSUE
Status: DISCONTINUED | OUTPATIENT
Start: 2025-02-24 | End: 2025-02-24 | Stop reason: SDUPTHER

## 2025-02-24 RX ORDER — APREPITANT 40 MG/1
40 CAPSULE ORAL ONCE
Status: COMPLETED | OUTPATIENT
Start: 2025-02-24 | End: 2025-02-24

## 2025-02-24 RX ORDER — LIDOCAINE HYDROCHLORIDE 20 MG/ML
INJECTION, SOLUTION INFILTRATION; PERINEURAL
Status: DISCONTINUED | OUTPATIENT
Start: 2025-02-24 | End: 2025-02-24 | Stop reason: SDUPTHER

## 2025-02-24 RX ORDER — SODIUM CHLORIDE 9 MG/ML
INJECTION, SOLUTION INTRAVENOUS PRN
Status: DISCONTINUED | OUTPATIENT
Start: 2025-02-24 | End: 2025-02-24 | Stop reason: HOSPADM

## 2025-02-24 RX ORDER — OXYCODONE HYDROCHLORIDE 5 MG/1
10 TABLET ORAL EVERY 4 HOURS PRN
Status: CANCELLED | OUTPATIENT
Start: 2025-02-24

## 2025-02-24 RX ORDER — LIDOCAINE HYDROCHLORIDE 10 MG/ML
1 INJECTION, SOLUTION EPIDURAL; INFILTRATION; INTRACAUDAL; PERINEURAL
Status: DISCONTINUED | OUTPATIENT
Start: 2025-02-24 | End: 2025-02-24 | Stop reason: HOSPADM

## 2025-02-24 RX ORDER — ROCURONIUM BROMIDE 10 MG/ML
INJECTION, SOLUTION INTRAVENOUS
Status: DISCONTINUED | OUTPATIENT
Start: 2025-02-24 | End: 2025-02-24 | Stop reason: SDUPTHER

## 2025-02-24 RX ORDER — SODIUM CHLORIDE 9 MG/ML
INJECTION, SOLUTION INTRAVENOUS PRN
Status: CANCELLED | OUTPATIENT
Start: 2025-02-24

## 2025-02-24 RX ORDER — POLYETHYLENE GLYCOL 3350 17 G/17G
17 POWDER, FOR SOLUTION ORAL DAILY
Status: CANCELLED | OUTPATIENT
Start: 2025-02-24

## 2025-02-24 RX ORDER — DEXAMETHASONE SODIUM PHOSPHATE 10 MG/ML
INJECTION, SOLUTION INTRAMUSCULAR; INTRAVENOUS
Status: COMPLETED | OUTPATIENT
Start: 2025-02-24 | End: 2025-02-24

## 2025-02-24 RX ORDER — GLUCAGON 1 MG/ML
1 KIT INJECTION PRN
Status: DISCONTINUED | OUTPATIENT
Start: 2025-02-24 | End: 2025-02-24 | Stop reason: HOSPADM

## 2025-02-24 RX ORDER — SODIUM CHLORIDE, SODIUM LACTATE, POTASSIUM CHLORIDE, CALCIUM CHLORIDE 600; 310; 30; 20 MG/100ML; MG/100ML; MG/100ML; MG/100ML
INJECTION, SOLUTION INTRAVENOUS CONTINUOUS
Status: CANCELLED | OUTPATIENT
Start: 2025-02-24

## 2025-02-24 RX ORDER — TRANEXAMIC ACID 10 MG/ML
1000 INJECTION, SOLUTION INTRAVENOUS ONCE
Status: DISCONTINUED | OUTPATIENT
Start: 2025-02-24 | End: 2025-02-24 | Stop reason: HOSPADM

## 2025-02-24 RX ORDER — MAGNESIUM SULFATE IN WATER 40 MG/ML
2000 INJECTION, SOLUTION INTRAVENOUS ONCE
Status: COMPLETED | OUTPATIENT
Start: 2025-02-24 | End: 2025-02-24

## 2025-02-24 RX ORDER — OXYCODONE HYDROCHLORIDE 5 MG/1
5 TABLET ORAL EVERY 4 HOURS PRN
Status: CANCELLED | OUTPATIENT
Start: 2025-02-24

## 2025-02-24 RX ORDER — SODIUM CHLORIDE, SODIUM LACTATE, POTASSIUM CHLORIDE, CALCIUM CHLORIDE 600; 310; 30; 20 MG/100ML; MG/100ML; MG/100ML; MG/100ML
INJECTION, SOLUTION INTRAVENOUS CONTINUOUS
Status: DISCONTINUED | OUTPATIENT
Start: 2025-02-24 | End: 2025-02-24 | Stop reason: HOSPADM

## 2025-02-24 RX ORDER — CYCLOBENZAPRINE HCL 10 MG
10 TABLET ORAL 3 TIMES DAILY PRN
Qty: 30 TABLET | Refills: 0 | Status: SHIPPED | OUTPATIENT
Start: 2025-02-24 | End: 2025-03-06

## 2025-02-24 RX ORDER — PROPOFOL 10 MG/ML
INJECTION, EMULSION INTRAVENOUS
Status: DISCONTINUED | OUTPATIENT
Start: 2025-02-24 | End: 2025-02-24 | Stop reason: SDUPTHER

## 2025-02-24 RX ORDER — OXYCODONE HYDROCHLORIDE 5 MG/1
5 TABLET ORAL EVERY 6 HOURS PRN
Qty: 28 TABLET | Refills: 0 | Status: SHIPPED | OUTPATIENT
Start: 2025-02-24 | End: 2025-03-03

## 2025-02-24 RX ORDER — SENNOSIDES A AND B 8.6 MG/1
1 TABLET, FILM COATED ORAL DAILY PRN
Status: CANCELLED | OUTPATIENT
Start: 2025-02-24

## 2025-02-24 RX ORDER — TRANEXAMIC ACID 10 MG/ML
1000 INJECTION, SOLUTION INTRAVENOUS
Status: COMPLETED | OUTPATIENT
Start: 2025-02-24 | End: 2025-02-24

## 2025-02-24 RX ORDER — SODIUM CHLORIDE 0.9 % (FLUSH) 0.9 %
5-40 SYRINGE (ML) INJECTION EVERY 12 HOURS SCHEDULED
Status: CANCELLED | OUTPATIENT
Start: 2025-02-24

## 2025-02-24 RX ORDER — NALOXONE HYDROCHLORIDE 0.4 MG/ML
INJECTION, SOLUTION INTRAMUSCULAR; INTRAVENOUS; SUBCUTANEOUS PRN
Status: DISCONTINUED | OUTPATIENT
Start: 2025-02-24 | End: 2025-02-24 | Stop reason: HOSPADM

## 2025-02-24 RX ORDER — ACETAMINOPHEN 325 MG/1
650 TABLET ORAL EVERY 6 HOURS
Status: CANCELLED | OUTPATIENT
Start: 2025-02-24

## 2025-02-24 RX ORDER — CEPHALEXIN 500 MG/1
500 CAPSULE ORAL 4 TIMES DAILY
Qty: 40 CAPSULE | Refills: 0 | Status: SHIPPED | OUTPATIENT
Start: 2025-02-25

## 2025-02-24 RX ORDER — INSULIN LISPRO 100 [IU]/ML
0-8 INJECTION, SOLUTION INTRAVENOUS; SUBCUTANEOUS
Status: DISCONTINUED | OUTPATIENT
Start: 2025-02-24 | End: 2025-02-24 | Stop reason: HOSPADM

## 2025-02-24 RX ORDER — CELECOXIB 100 MG/1
200 CAPSULE ORAL ONCE
Status: COMPLETED | OUTPATIENT
Start: 2025-02-24 | End: 2025-02-24

## 2025-02-24 RX ORDER — SODIUM CHLORIDE 0.9 % (FLUSH) 0.9 %
5-40 SYRINGE (ML) INJECTION PRN
Status: CANCELLED | OUTPATIENT
Start: 2025-02-24

## 2025-02-24 RX ORDER — HYDROMORPHONE HYDROCHLORIDE 2 MG/ML
INJECTION, SOLUTION INTRAMUSCULAR; INTRAVENOUS; SUBCUTANEOUS
Status: DISCONTINUED | OUTPATIENT
Start: 2025-02-24 | End: 2025-02-24 | Stop reason: SDUPTHER

## 2025-02-24 RX ORDER — ONDANSETRON 2 MG/ML
INJECTION INTRAMUSCULAR; INTRAVENOUS
Status: DISCONTINUED | OUTPATIENT
Start: 2025-02-24 | End: 2025-02-24 | Stop reason: SDUPTHER

## 2025-02-24 RX ORDER — INSULIN GLARGINE 100 [IU]/ML
0.25 INJECTION, SOLUTION SUBCUTANEOUS DAILY
Status: DISCONTINUED | OUTPATIENT
Start: 2025-02-24 | End: 2025-02-24 | Stop reason: HOSPADM

## 2025-02-24 RX ORDER — ASPIRIN 81 MG/1
81 TABLET, CHEWABLE ORAL 2 TIMES DAILY
Qty: 60 TABLET | Refills: 0 | Status: SHIPPED | OUTPATIENT
Start: 2025-02-25

## 2025-02-24 RX ORDER — MORPHINE SULFATE 4 MG/ML
4 INJECTION, SOLUTION INTRAMUSCULAR; INTRAVENOUS
Status: CANCELLED | OUTPATIENT
Start: 2025-02-24

## 2025-02-24 RX ORDER — LABETALOL HYDROCHLORIDE 5 MG/ML
10 INJECTION, SOLUTION INTRAVENOUS
Status: DISCONTINUED | OUTPATIENT
Start: 2025-02-24 | End: 2025-02-24 | Stop reason: HOSPADM

## 2025-02-24 RX ADMIN — SUGAMMADEX 200 MG: 100 INJECTION, SOLUTION INTRAVENOUS at 10:11

## 2025-02-24 RX ADMIN — APREPITANT 40 MG: 40 CAPSULE ORAL at 07:31

## 2025-02-24 RX ADMIN — OXYCODONE 10 MG: 5 TABLET ORAL at 11:18

## 2025-02-24 RX ADMIN — DEXAMETHASONE SODIUM PHOSPHATE 4 MG: 10 INJECTION, SOLUTION INTRAMUSCULAR; INTRAVENOUS at 07:46

## 2025-02-24 RX ADMIN — SODIUM CHLORIDE, SODIUM LACTATE, POTASSIUM CHLORIDE, AND CALCIUM CHLORIDE: .6; .31; .03; .02 INJECTION, SOLUTION INTRAVENOUS at 10:14

## 2025-02-24 RX ADMIN — DEXAMETHASONE SODIUM PHOSPHATE 6 MG: 4 INJECTION, SOLUTION INTRAMUSCULAR; INTRAVENOUS at 08:50

## 2025-02-24 RX ADMIN — LIDOCAINE HYDROCHLORIDE 60 MG: 20 INJECTION, SOLUTION INFILTRATION; PERINEURAL at 08:45

## 2025-02-24 RX ADMIN — ONDANSETRON 4 MG: 2 INJECTION INTRAMUSCULAR; INTRAVENOUS at 10:05

## 2025-02-24 RX ADMIN — FENTANYL CITRATE 50 MCG: 50 INJECTION, SOLUTION INTRAMUSCULAR; INTRAVENOUS at 09:04

## 2025-02-24 RX ADMIN — METHOCARBAMOL 500 MG: 100 INJECTION INTRAMUSCULAR; INTRAVENOUS at 09:09

## 2025-02-24 RX ADMIN — FENTANYL CITRATE 50 MCG: 50 INJECTION, SOLUTION INTRAMUSCULAR; INTRAVENOUS at 07:40

## 2025-02-24 RX ADMIN — MAGNESIUM SULFATE IN WATER 2000 MG: 40 INJECTION, SOLUTION INTRAVENOUS at 08:50

## 2025-02-24 RX ADMIN — SODIUM CHLORIDE, SODIUM LACTATE, POTASSIUM CHLORIDE, AND CALCIUM CHLORIDE: .6; .31; .03; .02 INJECTION, SOLUTION INTRAVENOUS at 08:41

## 2025-02-24 RX ADMIN — BUPIVACAINE HYDROCHLORIDE 20 ML: 5 INJECTION, SOLUTION EPIDURAL; INTRACAUDAL; PERINEURAL at 07:46

## 2025-02-24 RX ADMIN — Medication 2 AMPULE: at 07:27

## 2025-02-24 RX ADMIN — PROPOFOL 120 MG: 10 INJECTION, EMULSION INTRAVENOUS at 08:45

## 2025-02-24 RX ADMIN — ROCURONIUM BROMIDE 50 MG: 50 INJECTION, SOLUTION INTRAVENOUS at 08:46

## 2025-02-24 RX ADMIN — PROPOFOL 30 MG: 10 INJECTION, EMULSION INTRAVENOUS at 09:09

## 2025-02-24 RX ADMIN — CELECOXIB 200 MG: 100 CAPSULE ORAL at 07:27

## 2025-02-24 RX ADMIN — ACETAMINOPHEN 1000 MG: 500 TABLET ORAL at 07:27

## 2025-02-24 RX ADMIN — PROPOFOL 100 MG: 10 INJECTION, EMULSION INTRAVENOUS at 10:26

## 2025-02-24 RX ADMIN — ONDANSETRON 4 MG: 2 INJECTION, SOLUTION INTRAMUSCULAR; INTRAVENOUS at 14:07

## 2025-02-24 RX ADMIN — HYDROMORPHONE HYDROCHLORIDE 0.5 MG: 2 INJECTION, SOLUTION INTRAMUSCULAR; INTRAVENOUS; SUBCUTANEOUS at 09:10

## 2025-02-24 RX ADMIN — TRANEXAMIC ACID 1000 MG: 10 INJECTION, SOLUTION INTRAVENOUS at 09:00

## 2025-02-24 RX ADMIN — CEFAZOLIN 2000 MG: 2 INJECTION, POWDER, FOR SOLUTION INTRAVENOUS at 08:50

## 2025-02-24 ASSESSMENT — PAIN DESCRIPTION - ORIENTATION
ORIENTATION: LEFT
ORIENTATION: RIGHT

## 2025-02-24 ASSESSMENT — PAIN SCALES - GENERAL
PAINLEVEL_OUTOF10: 0
PAINLEVEL_OUTOF10: 7
PAINLEVEL_OUTOF10: 4

## 2025-02-24 ASSESSMENT — PAIN DESCRIPTION - LOCATION
LOCATION: KNEE
LOCATION: KNEE

## 2025-02-24 NOTE — ANESTHESIA PRE PROCEDURE
\"PO2ART\", \"LSH7GSO\", \"OBS8JJT\", \"BEART\", \"U2RSRHIB\"     Type & Screen (If Applicable):  Lab Results   Component Value Date    ABORH O POS 01/23/2025    LABANTI NEG 01/23/2025       Drug/Infectious Status (If Applicable):  No results found for: \"HIV\", \"HEPCAB\"    COVID-19 Screening (If Applicable):   Lab Results   Component Value Date/Time    COVID19 Not Detected 09/28/2021 01:33 PM           Anesthesia Evaluation    Airway: Mallampati: I          Dental: normal exam         Pulmonary: breath sounds clear to auscultation                             Cardiovascular:    (+) valvular problems/murmurs: MR, murmur: Grade 2, Mitral        Rhythm: regular  Rate: normal                    Neuro/Psych:               GI/Hepatic/Renal:             Endo/Other:                     Abdominal:             Vascular:          Other Findings:       Anesthesia Plan      general     ASA 3                               Daniel Middleton MD   2/24/2025

## 2025-02-24 NOTE — PROGRESS NOTES
Training;Precautions;Home Exercise Program;Mobility Training  Education Provided Comments: Disease Specific Education: Pt educated on weight bearing status, post-op precautions, appropriate DME, exercises performed this date and safe mobility with AD.  Education Method: Verbal;Demonstration;Printed Information/Hand-outs  Barriers to Learning: None  Education Outcome: Verbalized understanding;Demonstrated understanding;Continued education needed    Pt educated on the importance of early functional mobility post surgery to enhance household mobility, independence, and safety upon discharge.   []  Pt verbalized understanding of the importance of functional ambulation, maintaining any ordered precautions while performing  [x]  Pt performed 20 feet of functional ambulation  [x]  Pt requires SBA and will have someone at discharge to help with functional activities    []  Pt was unable to perform 20 ft of functional ambulation d/t:   []  Pain   []  Nausea   []  Anesthesia  []  Blood Pressure   []  Other      Patient Educated in safety with car transfers and  dispensed instruction on car transfers with use of assistive device with patient demonstrating:  [x] Verbalizing understanding of appropriate technique, maintaining any ordered precautions for car transfer training s/p TJR  [] Caroline with simulated car transfer with walker  [] He/she requires  assist and will have someone at discharge to help with transfers with patient verbalizing understanding of any ordered TJR precautions employing appropriate technique.  [x] Other: Educated patient in written car transfer instruction with patient verbalizing understanding of appropriate techniques.    Therapy Time   Individual Concurrent Group Co-treatment   Time In 1300         Time Out 1348         Minutes 48         Timed Code Treatment Minutes: 38 Minutes (10 min eval)       Kirsten Benedict, PT

## 2025-02-24 NOTE — H&P
Update History & Physical     The patient's History and Physical of 2/5/2025 was reviewed with the patient, and I examined the patient. There were no changes - see below for exam of affected area.  Today's exam of affected area, in reference to the planned operation today, is unchanged from surgeon's office note on 1/23/2025 (see note)    Both the patient and I confirmed the surgical site.     Plan: The risks, benefits, expected outcome, and alternative to the recommended procedure have been discussed with the patient / family. Patient understands and wants to proceed with the procedure.      Electronically signed by Killian Downey MD on 2/24/2025 at 7:16 AM

## 2025-02-24 NOTE — OP NOTE
Orthopaedic Surgery  Operative Report      Patient Name:  Suzi Alas  Patient :  1950  MRN: [id number]    Date: 25     Pre-operative Diagnosis:   M17.11 Primary Osteoarthritis    Post-operative Diagnosis:    Same    Procedure: RIGHT  36215 Total Knee Arthroplasty  Modifier 22    Surgeon:  Surgeons and Role:     * Killian Downey MD - Primary    Assistant: Circulator: Mely Davidson RN  Surgical Assistant: Quinten Brice; Aleshia Rob  Scrub Person First: Sejal Zamarripa RN    Anesthesia: General endotracheal anesthesia, Intraoperative local infiltration - Ortho-cocktail mixture, and Regional with adductor canal block    Estimated blood loss: 400    Specimens: * No specimens in log *    Complications: None    Drains: None    Condition: Stable    Implants:   Implant Name Type Inv. Item Serial No.  Lot No. LRB No. Used Action   CEMENT BNE 20ML 41GM FULL DOSE PMMA W/ TOBRA M VISC RADPQ - [id number]  CEMENT BNE 20ML 41GM FULL DOSE PMMA W/ TOBRA M VISC RADPQ  [employer] [facility] [id number] Right 1 Implanted   CEMENT BNE 20ML 40GM FULL DOSE PMMA W/O ANTIBIO M VISC - [id number]  CEMENT BNE 20ML 40GM FULL DOSE PMMA W/O ANTIBIO M VISC  [employer] [facility] [id number] Right 1 Implanted   PSN MC VE ASF R 10MM 6-7/CD - [id number]  PSN MC VE ASF R 10MM 6-7/CD  [employer]- [id number] Right 1 Implanted   COMPONENT PAT [id number] THK8MM STD VIVACIT-E AMINA INSET FOR - [id number]  COMPONENT PAT [id number] THK8MM STD VIVACIT-E AMINA INSET FOR  [employer]- [id number] Right 1 Implanted   EXTENSION STEM L30MM [id number] KNEE TAPR AMINA PERSONA - [id number]  EXTENSION STEM L30MM [id number] KNEE TAPR AMINA PERSONA  [employer]- [id number] Right 1 Implanted   DUP USE [id number] IMPL KNEE PSN TIB STM 5 DEG SZ D R - [id number] Knee DUP USE [id number] IMPL KNEE PSN TIB STM 5 DEG SZ D R  [employer]-PMM [id number] Right 1 Implanted   COMPONENT FEM SZ 7 MARIAMA R KNEE CO CHROM AMINA

## 2025-02-24 NOTE — ANESTHESIA PROCEDURE NOTES
Peripheral Block    Patient location during procedure: pre-op  Reason for block: post-op pain management and at surgeon's request  Start time: 2/24/2025 7:40 AM  End time: 2/24/2025 7:46 AM  Staffing  Performed: resident/CRNA   Resident/CRNA: Ton Dan APRN - CRNA  Performed by: Ton Dan APRN - CRNA  Authorized by: Ton Dan APRN - CRNA    Preanesthetic Checklist  Completed: patient identified, IV checked, site marked, risks and benefits discussed, surgical/procedural consents, equipment checked, pre-op evaluation, timeout performed, anesthesia consent given, oxygen available, monitors applied/VS acknowledged, fire risk safety assessment completed and verbalized and blood product R/B/A discussed and consented  Peripheral Block   Patient position: supine  Prep: ChloraPrep  Provider prep: mask and sterile gloves  Patient monitoring: cardiac monitor, continuous pulse ox, frequent blood pressure checks, IV access, oxygen and responsive to questions  Block type: Femoral  Laterality: right  Injection technique: single-shot  Guidance: ultrasound guided    Needle   Needle type: insulated echogenic nerve stimulator needle   Needle gauge: 21 G  Needle localization: ultrasound guidance and anatomical landmarks  Needle length: 8 cm  Assessment   Injection assessment: negative aspiration for heme, no paresthesia on injection, local visualized surrounding nerve on ultrasound and no intravascular symptoms  Paresthesia pain: none  Slow fractionated injection: yes  Hemodynamics: stable  Outcomes: uncomplicated and patient tolerated procedure well    Medications Administered  BUPivacaine (MARCAINE) PF injection 0.5% - Perineural   20 mL - 2/24/2025 7:46:00 AM  dexAMETHasone (DECADRON) (PF) 10 mg/mL injection - Perineural   4 mg - 2/24/2025 7:46:00 AM  fentaNYL (SUBLIMAZE) injection - IntraVENous   50 mcg - 2/24/2025 7:40:00 AM

## 2025-02-24 NOTE — DISCHARGE INSTRUCTIONS
Total Knee Replacement  Discharge Instructions    To prevent Clot formation, you have been placed on the following medication:  Take aspirin 81 mg twice a day starting day after surgery   Surgical Site Care:  Keep incision clean and dry.  May shower on post-op day #3 with waterproof dressing on.  Change to new waterproof dressing between 5-7 days.   Physical Therapy:  Weight Bearing Status:  Weight bearing as tolerated  Outpatient therapy-should start within 2-3 days  Precautions  Per Physical Therapy Handout  Pain Medications  You were given oxycodone (Oxycontin, Oxyir)  Wean off pain medications as you deem appropriate as long as pain is under control  Be sure to drink plenty of fluids (recommend water) while taking narcotic pain medications to prevent constipation  You may take an over the counter laxative or stool softener as needed to prevent/treat constipation as well, we recommend Senokot S OTC.  We recommend that you consider taking these medications the entire time you are taking pain medication.  Cold packs/Ice packs/Machine  May be used as much as necessary to control swelling/inflammation/soreness  Be sure to have a barrier (cloth, clothing, towel) between the site and the ice pack to prevent frostbite  Contact [facility]'s office if  Increased redness, swelling, drainage of any kind, and/or pain to surgery site.  As well as new onset fevers and or chills.  These could signify an infection.  Calf or thigh tenderness to touch as well as increased swelling or redness.  This could signify a clot formation.  Numbness or tingling to an area around the incision site or below the incision site (toes).  Any rash appears, increased  or new onset nausea/vomiting occur.  This may indicate a reaction to a medication.  Phone # [phone]  Follow up with Dr. Downey or Toi Molina PA-C at scheduled appointment time.   Please continue to use your Incentive Spirometer every hour while awake.   Patient should have  nonoperative side thigh-high MISA hose on before they leave the hospital.  At first dressing change postoperatively they should have bilateral lower extremity MISA hose on.  *** Please contact Kaia Culp Orthopaedic Nurse Navigator with any questions or concerns after your discharge.*** Mon- Fri 9am- 5pm [phone]. If you have any issues or concerns after 5pm or on the weekend please call your surgeon's office. I will be contacting you in a few days to follow up.    If you need a pain medication refill please contact your surgeon's office.

## 2025-02-24 NOTE — PROGRESS NOTES
Pt has been given discharge instructions and verbalizes understanding of the following: Dressing and incision care. Post op medications including when to take and possible side effects of the following medications: Anticoagulation, pain medication, and stool softeners. How to relieve pain and swelling through non- pharmacological comfort measures. When to call the office for questions or concerns, when to follow up with surgeon, and instructions on use of the incentive spirometer.   Pt has worked with physical therapy and has received a list of exercises and has discussed precautions. Pt has been educated on use of ambulation aides, bed mobility, fall safety, bathing safety, and when to start physical therapy. Pt has been given instructions on how and when to use knee brace if required by surgeon.   Pt has been educated on signs and symptoms of infection, inadequate pain control, and when and who to call for changes in condition.   Pt is able to tolerate PO, pt has voided post-op, and pt has ambulated safely with physical therapy post-op.   Pt has met discharge criteria.

## 2025-02-24 NOTE — ANESTHESIA POSTPROCEDURE EVALUATION
Department of Anesthesiology  Postprocedure Note    Patient: Suzi Alas  MRN: [id number]  YOB: 1950  Date of evaluation: 2/24/2025    Procedure Summary       Date: 02/24/25 Room / Location: [address]    Anesthesia Start: 0841 Anesthesia Stop: 1042    Procedure: RIGHT TOTAL KNEE ARTHROPLASTY (Right: Knee) Diagnosis:       Osteoarthritis of right knee      (Osteoarthritis of right knee [M17.11])    Surgeons: Killian Downey MD Responsible Provider: Daniel Middleton MD    Anesthesia Type: general ASA Status: 3            Anesthesia Type: No value filed.    Cassius Phase I: Cassius Score: 9    Cassius Phase II:      Anesthesia Post Evaluation    Patient location during evaluation: PACU  Level of consciousness: awake  Airway patency: patent  Nausea & Vomiting: no vomiting  Cardiovascular status: blood pressure returned to baseline  Respiratory status: acceptable  Hydration status: stable  Pain management: adequate    No notable events documented.

## 2025-02-24 NOTE — PROGRESS NOTES
Pt brought to PACU. Report obtained from OR RN and anesthesia. Pt placed on monitor NSR and O2 at 97% on 6L Simple Mask.

## 2025-02-25 ENCOUNTER — TELEPHONE (OUTPATIENT)
Dept: ORTHOPEDIC SURGERY | Age: 75
End: 2025-02-25

## 2025-02-25 NOTE — TELEPHONE ENCOUNTER
Spoke with pt and spouse- pt is doing pretty well.   Reviewed all new medications with patients with instructions on how to take medications. Pt verbalized understanding.     Incision status: No drainage or redness    Edema/Swelling/Teds: Swelling is still there, but has been icing and elevating. Has ice machine in place.     Pain level and status: Managing pretty well     Use of pain medications: Using 1 every 6 hours or so. Using celebrex, no muscle relaxant used so far.     Blood thinner: ASA 81mg BID with no issues    Bowels: Using stool softener, pass gas.     Home Care Agency active: NA    Outpatient therapy: OP PT starts tomorrow.     Do you have all of your medications: Yes    Changes in medications: no    Instructed pt to call Nurse Navigator or surgeon's office with any questions or concerns.     Follow up appointments:    Future Appointments   Date Time Provider Department Center   2/26/2025  1:40 PM Norma Enriquez, PT [facility] [place] [facility] [place]   2/28/2025 10:20 AM Norma Enriquez PT [facility] [place] [facility] [place]   3/3/2025  1:40 PM Kizzy Cunningham, PTA [facility] [place] [facility] [place]   3/5/2025  1:40 PM Norma Enriquez, PT [facility] [place] [facility] [place]   3/10/2025  1:40 PM Andrew Cunninghamin, PTA [facility] [place] [facility] [place]   3/12/2025  1:40 PM Andrew Cunninghamin, PTA [facility] [place] [facility] [place]   3/13/2025 10:15 AM Killian Downey MD [facility]   3/17/2025  1:40 PM Norma Enriquez PT [facility] [place] [facility] [place] [facility]   3/20/2025  1:40 PM Kizzy Cunningham, PTA [facility] [place] [facility]   3/24/2025  1:40 PM Norma Enriquez PT [facility] [place] [facility]   3/27/2025  1:40 PM Kizzy Cunningham, PTA [facility] [place] [facility]   3/31/2025  1:40 PM Andrew Cunninghamin, PTA [facility] [place] [facility]   4/3/2025  1:40 PM Norma Enriquez PT [facility] [place] [facility]   Orthopedic Nurse Navigator   Phone number: [phone]

## 2025-02-26 ENCOUNTER — HOSPITAL ENCOUNTER (OUTPATIENT)
Dept: PHYSICAL THERAPY | Age: 75
Setting detail: THERAPIES SERIES
Discharge: HOME | End: 2025-02-26
Attending: ORTHOPAEDIC SURGERY
Payer: MEDICARE

## 2025-02-26 DIAGNOSIS — M62.81 MUSCLE WEAKNESS OF LOWER EXTREMITY: ICD-10-CM

## 2025-02-26 DIAGNOSIS — R26.89 ANTALGIC GAIT: ICD-10-CM

## 2025-02-26 DIAGNOSIS — M25.561 CHRONIC PAIN OF RIGHT KNEE: Primary | ICD-10-CM

## 2025-02-26 DIAGNOSIS — G89.29 CHRONIC PAIN OF RIGHT KNEE: Primary | ICD-10-CM

## 2025-02-26 PROCEDURE — 97140 MANUAL THERAPY 1/> REGIONS: CPT

## 2025-02-26 PROCEDURE — 97161 PT EVAL LOW COMPLEX 20 MIN: CPT

## 2025-02-26 PROCEDURE — 97110 THERAPEUTIC EXERCISES: CPT

## 2025-02-26 NOTE — PLAN OF CARE
[facility]: [address] [address] office: [phone] fax: [phone]     Physical Therapy Initial Evaluation Certification      Dear Killian Downey MD ,    We had the pleasure of evaluating the following patient for physical therapy services at [facility].  A summary of our findings can be found in the initial assessment below.  This includes our plan of care.  If you have any questions or concerns regarding these findings, please do not hesitate to contact me at the office phone number listed above.  Thank you for the referral.     Physician Signature:_______________________________Date:__________________  By signing above (or electronic signature), therapist’s plan is approved by physician       Physical Therapy: TREATMENT/PROGRESS NOTE   Patient: Suzi Alas (75 y.o. female)   Examination Date: 2025   :  1950 MRN: [id number]   Visit #: 1  KX Modifier at visit 15  Insurance Allowable Auth Needed   Med Nec    KX Modifier at visit 15 []Yes    []No    Insurance: Payor: MEDICARE / Plan: MEDICARE PART A AND B / Product Type: *No Product type* /   Insurance ID: [id number] - (Medicare)  Secondary Insurance (if applicable): [employer]   Treatment Diagnosis:     ICD-10-CM    1. Chronic pain of right knee  M25.561     G89.29       2. Muscle weakness of lower extremity  M62.81       3. Antalgic gait  R26.89          Medical Diagnosis: R Knee OA M17.11 S/P total knee arthroplasty, right [Z96.651] 2025   Referring Physician: Killian Downey MD  PCP: Emmanuel Mccauley DO     Plan of care signed (Y/N):     Date of Patient follow up with Physician:      Plan of Care Report: EVAL today  POC update due: (10 visits /OR AUTH LIMITS, whichever is less)  3/28/2025                                             Medical History:  Comorbidities:  Cancer/Tumor  Diabetes (Type I or

## 2025-02-28 ENCOUNTER — HOSPITAL ENCOUNTER (OUTPATIENT)
Dept: PHYSICAL THERAPY | Age: 75
Setting detail: THERAPIES SERIES
Discharge: HOME | End: 2025-02-28
Attending: ORTHOPAEDIC SURGERY
Payer: MEDICARE

## 2025-02-28 PROCEDURE — 97140 MANUAL THERAPY 1/> REGIONS: CPT

## 2025-02-28 PROCEDURE — 97112 NEUROMUSCULAR REEDUCATION: CPT

## 2025-02-28 PROCEDURE — 97110 THERAPEUTIC EXERCISES: CPT

## 2025-02-28 NOTE — FLOWSHEET NOTE
Gait speed (m/s)  4x10m fast walk  Result = 40m/total time 1.55 m/s  N.T.       Supine Hip/Knee AROM L R L R L R L R   Knee Flexion 125 °  120 °   125 °  PROM  Flex 96 °   Ext lacking 3 °   AAROM  Flex 85 °            Knee Extension Lacking 2 °  Lacking 5 °   0 °   lacking 4 °            LEFS 17/80  79%  23/80  71%       WOMAC (raw) 45/96  47% 74/96  77%                 Exercises/Interventions     Therapeutic Ex (67041)   NMR re-education (30759)  resistance Sets/time Reps Notes/Cues/Progressions          Quad Sets  10\" 15 x     Glut Sets  10\" 15 x            Heel Slides  10\" 15 x            SLR   10 x            SAQ  5\" 20 x            HL Hip Add Squeeze  5\" 20 x            Heel Prop  4'            LAQ  5\"     EOB Knee Flex Stretch  30\" 3 x     EOB HS Stretch  30\" 3 x                   Manual Intervention (54582)  TIME     Patella Mobs  5'     PROM knee flexion  10'                          Gait Training (20486) resistance Sets/time Reps CUES NEEDED                                      Therapeutic Activity (37549)  Sets/time                                          Modalities:    No modalities applied this session    Education/Home Exercise Program: Patient HEP program created electronically.  Refer to [employer] access code: Access Code: [email or web address]  Access Code: [email or web address]  URL: [email or web address]  Date: 02/26/2025  Prepared by: Norma Enriquez     Exercises  - Long Sitting Calf Stretch with Strap  - 2 x daily - 7 x weekly - 3-5 reps - 30 seconds hold  - Long Sitting Quad Set  - 2 x daily - 7 x weekly - 10 reps - 10 seconds hold  - Seated Long Arc Quad  - 2 x daily - 7 x weekly - 3 sets - 10 reps - 1 hold  - Mini Squat with Counter Support  - 2 x daily - 7 x weekly - 3 sets - 10 reps  - Supine Heel Slide with Strap  - 1 x daily - 7 x weekly - 1 sets - 10 reps - 10\" hold  - Small Range Straight Leg Raise  - 1 x daily - 7 x weekly - 3 sets - 10 reps  - Seated Knee Flexion AAROM  - 1 x daily - 7 x weekly - 1

## 2025-03-03 ENCOUNTER — HOSPITAL ENCOUNTER (OUTPATIENT)
Dept: PHYSICAL THERAPY | Age: 75
Setting detail: THERAPIES SERIES
Discharge: HOME | End: 2025-03-03
Attending: ORTHOPAEDIC SURGERY
Payer: MEDICARE

## 2025-03-03 PROCEDURE — 97110 THERAPEUTIC EXERCISES: CPT | Performed by: PHYSICAL THERAPY ASSISTANT

## 2025-03-03 PROCEDURE — 97140 MANUAL THERAPY 1/> REGIONS: CPT | Performed by: PHYSICAL THERAPY ASSISTANT

## 2025-03-03 PROCEDURE — 97112 NEUROMUSCULAR REEDUCATION: CPT | Performed by: PHYSICAL THERAPY ASSISTANT

## 2025-03-03 NOTE — FLOWSHEET NOTE
and/or progression.  (05927) MANUAL THERAPY -  Manual therapy techniques, 1 or more regions, each 15 minutes (Mobilization/manipulation, manual lymphatic drainage, manual traction) for the purpose of modulating pain, promoting relaxation,  increasing ROM, reducing/eliminating soft tissue swelling/inflammation/restriction, improving soft tissue extensibility and allowing for proper ROM for normal function with self care, mobility, lifting and ambulation    GOALS     Patient stated goal: To be able to return to PLOF.  [] Progressing: [] Met: [] Not Met: [] Adjusted    Therapist goals for Patient:   Short Term Goals: To be achieved in: 2 weeks  1Independent in HEP and progression per patient tolerance, in order to prevent re-injury.   [x] Progressing: [] Met: [] Not Met: [] Adjusted  Patient will have a decrease in pain to <2/10 to facilitate improvement in movement, function, and ADLs as indicated by Functional Deficits.  [x] Progressing: [] Met: [] Not Met: [] Adjusted         Long Term Goals: To be achieved in: 12 weeks  Disability index score of 30% or less for the LEFS to assist with reaching prior level of function with activities such as walking without AD.  [] Progressing: [] Met: [] Not Met: [] Adjusted  Patient will demonstrate increased AROM of R Knee to 0-120 °  without pain to allow for proper joint functioning to enable patient to get in and out of shower.   [] Progressing: [] Met: [] Not Met: [] Adjusted  Patient will demonstrate increased Strength of R LE to at least 4+/5 throughout without pain to allow for proper functional mobility to enable patient to return to be able to get into and out of the car without assitance.   [] Progressing: [] Met: [] Not Met: [] Adjusted  Patient will return to ascending and descending stairs reciprocally without increased symptoms or restriction.   [] Progressing: [] Met: [] Not Met: [] Adjusted  To be able to play with grandkids (patient specific functional goal)

## 2025-03-05 ENCOUNTER — HOSPITAL ENCOUNTER (OUTPATIENT)
Dept: PHYSICAL THERAPY | Age: 75
Setting detail: THERAPIES SERIES
Discharge: HOME | End: 2025-03-05
Attending: ORTHOPAEDIC SURGERY
Payer: MEDICARE

## 2025-03-05 ENCOUNTER — TELEPHONE (OUTPATIENT)
Dept: ORTHOPEDIC SURGERY | Age: 75
End: 2025-03-05

## 2025-03-05 PROCEDURE — 97112 NEUROMUSCULAR REEDUCATION: CPT | Performed by: PHYSICAL THERAPY ASSISTANT

## 2025-03-05 PROCEDURE — 97140 MANUAL THERAPY 1/> REGIONS: CPT | Performed by: PHYSICAL THERAPY ASSISTANT

## 2025-03-05 PROCEDURE — 97110 THERAPEUTIC EXERCISES: CPT | Performed by: PHYSICAL THERAPY ASSISTANT

## 2025-03-05 NOTE — FLOWSHEET NOTE
[facility] - Outpatient Rehabilitation and Therapy: [address], [place] [address] office: [phone] fax: [phone]       Physical Therapy: TREATMENT/PROGRESS NOTE   Patient: Suzi Alas (75 y.o. female)   Examination Date: 2025   :  1950 MRN: [id number]   Visit #: 4  KX Modifier at visit 15  Insurance Allowable Auth Needed   Med Nec    KX Modifier at visit 15 []Yes    []No    Insurance: Payor: MEDICARE / Plan: MEDICARE PART A AND B / Product Type: *No Product type* /   Insurance ID: [id number] - (Medicare)  Secondary Insurance (if applicable): [employer]   Treatment Diagnosis:     ICD-10-CM    1. Chronic pain of right knee  M25.561     G89.29       2. Muscle weakness of lower extremity  M62.81       3. Antalgic gait  R26.89          Medical Diagnosis: R Knee OA M17.11 Primary osteoarthritis of right knee [M17.11] 2025   Referring Physician: Killian Downey MD  PCP: Emmanuel Mccauley DO     Plan of care signed (Y/N):     Date of Patient follow up with Physician:      Plan of Care Report: NO  POC update due: (10 visits /OR AUTH LIMITS, whichever is less)  3/28/2025                                             Medical History:  Comorbidities:  Cancer/Tumor  Diabetes (Type I or II)  Hypertension  Osteoarthritis  Prior Surgeries: Knee Sx cement  Other: Thyriod CA, Breast CA      Relevant Medical History:   Past Medical History:   Diagnosis Date    Acid reflux     Arthritis     Diabetes mellitus (HCC)     Heart murmur     Hyperlipidemia     Hypertension     Kidney stone     PONV (postoperative nausea and vomiting)     Thyroid cancer (HCC)     S/P thyroidectomy                                              Precautions/ Contra-indications:           Latex allergy:  YES  Pacemaker:    NO  Contraindications for Manipulation: recent surgical history (relative)  Date of Surgery: 2025  Other:    Red Flags:  None    Suicide Screening:   The patient did not

## 2025-03-05 NOTE — TELEPHONE ENCOUNTER
Spoke with spouse, pt is currently in the shower. Getting ready for PT later today.   Pain management is going well. Pt using ROM tech and getting around well with walker.   Signed off from pt.  Instructed pt to call RN or Surgeon's office with any issues or concerns.    Instructed pt to call Nurse Navigator or surgeon's office with any questions or concerns.     Follow up appointments:    Future Appointments   Date Time Provider Department Center   3/5/2025  1:40 PM Kizzy Cunningham PTA [facility] [place] [facility]   3/10/2025  1:40 PM Kizzy Cunningham PTA [facility] [place] [facility]   3/12/2025  1:40 PM Kizzy Cunningham PTA [facility] [place] [facility]   3/13/2025 10:15 AM Killian Downey MD [facility]   3/17/2025  1:40 PM Norma Enriquez, PT [facility] [place] [facility]   3/20/2025  1:40 PM Norma Enriquez, PT [facility] [place] [facility]   3/24/2025  1:40 PM Norma Enriquez, PT [facility] [place] [facility]   3/27/2025  1:40 PM Norma Enriquez, PT [facility] [place] [facility]   3/31/2025  1:40 PM Norma Enriquez, PT [facility] [place] [facility]   4/3/2025  1:40 PM Norma Enriquez, PT [facility] [place] [facility]       Kaia Culp   Orthopedic Nurse Navigator   Phone number: [phone]

## 2025-03-06 ENCOUNTER — APPOINTMENT (OUTPATIENT)
Dept: PHYSICAL THERAPY | Age: 75
End: 2025-03-06
Attending: ORTHOPAEDIC SURGERY
Payer: MEDICARE

## 2025-03-10 ENCOUNTER — HOSPITAL ENCOUNTER (OUTPATIENT)
Dept: PHYSICAL THERAPY | Age: 75
Setting detail: THERAPIES SERIES
Discharge: HOME | End: 2025-03-10
Attending: ORTHOPAEDIC SURGERY
Payer: MEDICARE

## 2025-03-10 PROCEDURE — 97140 MANUAL THERAPY 1/> REGIONS: CPT

## 2025-03-10 PROCEDURE — 97110 THERAPEUTIC EXERCISES: CPT

## 2025-03-10 PROCEDURE — 97112 NEUROMUSCULAR REEDUCATION: CPT

## 2025-03-10 NOTE — FLOWSHEET NOTE
strength or increase flexibility, following either an injury or surgery.   (47635) NEUROMUSCULAR RE-EDUCATION - Provided therapeutic procedure on activities related to neuromuscular reeducation of movement, balance, coordination, kinesthetic sense, posture, and/or proprioception for sitting and/or standing activities. Provided HEP review and/or progression.  (91232) MANUAL THERAPY -  Manual therapy techniques, 1 or more regions, each 15 minutes (Mobilization/manipulation, manual lymphatic drainage, manual traction) for the purpose of modulating pain, promoting relaxation,  increasing ROM, reducing/eliminating soft tissue swelling/inflammation/restriction, improving soft tissue extensibility and allowing for proper ROM for normal function with self care, mobility, lifting and ambulation    GOALS     Patient stated goal: To be able to return to PLOF.  [] Progressing: [] Met: [] Not Met: [] Adjusted    Therapist goals for Patient:   Short Term Goals: To be achieved in: 2 weeks  1Independent in HEP and progression per patient tolerance, in order to prevent re-injury.   [x] Progressing: [] Met: [] Not Met: [] Adjusted  Patient will have a decrease in pain to <2/10 to facilitate improvement in movement, function, and ADLs as indicated by Functional Deficits.  [x] Progressing: [] Met: [] Not Met: [] Adjusted         Long Term Goals: To be achieved in: 12 weeks  Disability index score of 30% or less for the LEFS to assist with reaching prior level of function with activities such as walking without AD.  [] Progressing: [] Met: [] Not Met: [] Adjusted  Patient will demonstrate increased AROM of R Knee to 0-120 °  without pain to allow for proper joint functioning to enable patient to get in and out of shower.   [] Progressing: [] Met: [] Not Met: [] Adjusted  Patient will demonstrate increased Strength of R LE to at least 4+/5 throughout without pain to allow for proper functional mobility to enable patient to return to be

## 2025-03-12 ENCOUNTER — HOSPITAL ENCOUNTER (OUTPATIENT)
Dept: PHYSICAL THERAPY | Age: 75
Setting detail: THERAPIES SERIES
Discharge: HOME | End: 2025-03-12
Attending: ORTHOPAEDIC SURGERY
Payer: MEDICARE

## 2025-03-12 PROCEDURE — 97112 NEUROMUSCULAR REEDUCATION: CPT | Performed by: PHYSICAL THERAPY ASSISTANT

## 2025-03-12 PROCEDURE — 97140 MANUAL THERAPY 1/> REGIONS: CPT | Performed by: PHYSICAL THERAPY ASSISTANT

## 2025-03-12 PROCEDURE — 97110 THERAPEUTIC EXERCISES: CPT | Performed by: PHYSICAL THERAPY ASSISTANT

## 2025-03-12 NOTE — PLAN OF CARE
[facility] - Outpatient Rehabilitation and Therapy: [address] [place] [address] office: [phone] fax: [phone]    Physical Therapy Re-Certification Plan of Care    Dear Killian Downey MD  ,    We had the pleasure of treating the following patient for physical therapy services at [facility]. A summary of our findings can be found in the updated assessment below.  This includes our plan of care.  If you have any questions or concerns regarding these findings, please do not hesitate to contact me at the office phone number checked above.  Thank you for the referral.     Physician Signature:________________________________Date:__________________  By signing above (or electronic signature), therapist's plan is approved by physician      Total Visits: 6     Patient stated complaint:  Doing well.  Still a little stiff but less pain noted. Achi soreness.  No longer taking any pain pills.  Has trouble sleeping secondary to soreness at night (Notes she has always had trouble sleeping anyway).  She is using her walker when outside the house but feels comfortable going without it at home.      Test used Initial score  2025   Pain Summary VAS 4-5/10 1-2/10   Functional questionnaire LEFS 23/80  71% 33/80  59%   Other:       PROM   0-124 ° flex               Overall Response to Treatment:  Patient is responding well to treatment and improvement is noted with regards to goals    Recommendation:    [x] Continue PT 2x / wk for 4-6 weeks.   [] Hold PT, pending MD visit   [] Discharge to [facility]. Follow up with PT or MD PRN.        Physical Therapy: TREATMENT/PROGRESS NOTE   Patient: Suzi Alas (75 y.o. female)   Examination Date: 2025   :  1950 MRN: [id number]   Visit #: 6  KX Modifier at visit 15  Insurance Allowable Auth Needed   Med Nec    KX Modifier at visit 15 []Yes    []No    Insurance: Payor: MEDICARE / Plan: MEDICARE PART A AND

## 2025-03-13 ENCOUNTER — OFFICE VISIT (OUTPATIENT)
Dept: ORTHOPEDIC SURGERY | Age: 75
End: 2025-03-13

## 2025-03-13 VITALS — WEIGHT: 177 LBS | BODY MASS INDEX: 33.42 KG/M2 | HEIGHT: 61 IN

## 2025-03-13 DIAGNOSIS — Z96.651 S/P TOTAL KNEE ARTHROPLASTY, RIGHT: Primary | ICD-10-CM

## 2025-03-13 PROCEDURE — 99024 POSTOP FOLLOW-UP VISIT: CPT | Performed by: ORTHOPAEDIC SURGERY

## 2025-03-13 NOTE — PROGRESS NOTES
Dr Killian Downey      Date /Time 3/13/2025       10:08 AM EDT  Name Suzi Alas             1950   Location  [facility]  MRN [id number]                Chief Complaint   Patient presents with    Follow-up     1st PO Right TKA 02/24/25        History of Present Illness      Suzi Alas is a 75 y.o. female is here for post-op visit after RIGHT  [address]48 Total Knee Arthroplasty    Patient presents to the office today for follow-up visit.  Patient's status post total knee arthroplasty.  Patient doing well.  Patient denies fever, chills, or drainage.  Pain controlled.    Physical Exam    Based off 1997 Exam Criteria    Ht 1.549 m (5' 1\")   Wt 80.3 kg (177 lb)   BMI 33.44 kg/m²      Constitutional:       General: He is not in acute distress.     Appearance: Normal appearance.     RIGHT Knee: incision clean, intact, healing appropriately. No surrounding  erythema or fluctuance. Neuro intact distal. No evidence of DVT.    Range of motion:3-120    Imaging           Assessment and Plan    Suzi was seen today for follow-up.    Diagnoses and all orders for this visit:    S/P total knee arthroplasty, right        Patient doing well.  Continue with physical therapy.  Follow-up in 3 months or sooner if problems arise.    Electronically signed by Killian Downey MD on 3/13/2025 at 10:08 AM  This dictation was generated by voice recognition computer software.  Although all attempts are made to edit the dictation for accuracy, there may be errors in the transcription that are not intended.

## 2025-03-17 ENCOUNTER — HOSPITAL ENCOUNTER (OUTPATIENT)
Dept: PHYSICAL THERAPY | Age: 75
Setting detail: THERAPIES SERIES
Discharge: HOME | End: 2025-03-17
Attending: ORTHOPAEDIC SURGERY
Payer: MEDICARE

## 2025-03-17 PROCEDURE — 97110 THERAPEUTIC EXERCISES: CPT

## 2025-03-17 PROCEDURE — 97140 MANUAL THERAPY 1/> REGIONS: CPT

## 2025-03-17 PROCEDURE — 97112 NEUROMUSCULAR REEDUCATION: CPT

## 2025-03-17 NOTE — FLOWSHEET NOTE
Passive Range of Motion, Gr I-IV mobilizations, Soft Tissue Mobilization, Trigger Point Release, and Myofascial Release  Modalities as needed that may include: Cryotherapy, Electrical Stimulation, Thermal Agents, and Vasoneumatic Compression  Patient education on joint protection, postural re-education, activity modification, and progression of HEP    Plan: Cont POC- Continue emphasis/focus on exercise progression, modulating pain, promoting relaxation, increasing ROM, improving soft tissue extensibility, allowing for proper ROM, static and dynamic balance, and improving postural awareness. Next visit plan to add new exercises and continue current phase     Electronically Signed by Norma Enriquez, PT  Date: 03/17/2025     Note: Portions of this note have been templated and/or copied from initial evaluation, reassessments and prior notes for documentation efficiency.    Note: If patient does not return for scheduled/recommended follow up visits, this note will serve as a discharge from care along with the most recent update on progress.    Ortho Evaluation

## 2025-03-20 ENCOUNTER — HOSPITAL ENCOUNTER (OUTPATIENT)
Dept: PHYSICAL THERAPY | Age: 75
Setting detail: THERAPIES SERIES
Discharge: HOME | End: 2025-03-20
Attending: ORTHOPAEDIC SURGERY
Payer: MEDICARE

## 2025-03-20 PROCEDURE — 97140 MANUAL THERAPY 1/> REGIONS: CPT

## 2025-03-20 PROCEDURE — 97110 THERAPEUTIC EXERCISES: CPT

## 2025-03-20 PROCEDURE — 97112 NEUROMUSCULAR REEDUCATION: CPT

## 2025-03-20 NOTE — FLOWSHEET NOTE
[facility] - Outpatient Rehabilitation and Therapy: [address], [place] [address] office: [phone] fax: [phone]         Physical Therapy: TREATMENT/PROGRESS NOTE   Patient: Suzi Alas (75 y.o. female)   Examination Date: 2025   :  1950 MRN: [id number]   Visit #: 2  KX Modifier at visit 15  Insurance Allowable Auth Needed   Med Nec    KX Modifier at visit 15 []Yes    []No    Insurance: Payor: MEDICARE / Plan: MEDICARE PART A AND B / Product Type: *No Product type* /   Insurance ID: [id number] - (Medicare)  Secondary Insurance (if applicable): [employer]   Treatment Diagnosis:     ICD-10-CM    1. Chronic pain of right knee  M25.561     G89.29       2. Muscle weakness of lower extremity  M62.81       3. Antalgic gait  R26.89          Medical Diagnosis: R Knee OA M17.11 Primary osteoarthritis of right knee [M17.11] 2025   Referring Physician: Killian Downey MD  PCP: Emmanuel Mccauley DO     Plan of care signed (Y/N): Y    Date of Patient follow up with Physician:      Plan of Care Report: NO  POC update due: (10 visits /OR AUTH LIMITS, whichever is less)  2025                                             Medical History:  Comorbidities:  Cancer/Tumor  Diabetes (Type I or II)  Hypertension  Osteoarthritis  Prior Surgeries: Knee Sx cement  Other: Thyriod CA, Breast CA      Relevant Medical History:   Past Medical History:   Diagnosis Date    Acid reflux     Arthritis     Diabetes mellitus (HCC)     Heart murmur     Hyperlipidemia     Hypertension     Kidney stone     PONV (postoperative nausea and vomiting)     Thyroid cancer (HCC)     S/P thyroidectomy                                              Precautions/ Contra-indications:           Latex allergy:  YES  Pacemaker:    NO  Contraindications for Manipulation: recent surgical history (relative)  Date of Surgery: 2025  Other:    Red Flags:  None    Suicide Screening:   The patient did not

## 2025-03-24 ENCOUNTER — HOSPITAL ENCOUNTER (OUTPATIENT)
Dept: PHYSICAL THERAPY | Age: 75
Setting detail: THERAPIES SERIES
Discharge: HOME | End: 2025-03-24
Attending: ORTHOPAEDIC SURGERY
Payer: MEDICARE

## 2025-03-24 PROCEDURE — 97112 NEUROMUSCULAR REEDUCATION: CPT

## 2025-03-24 PROCEDURE — 97110 THERAPEUTIC EXERCISES: CPT

## 2025-03-24 PROCEDURE — 97140 MANUAL THERAPY 1/> REGIONS: CPT

## 2025-03-24 NOTE — FLOWSHEET NOTE
therapy this date, patient required verbal cueing, tactile cueing, muscle facilitation, modification of technique, progression of exercises and program, and manual interventions for exercise progression, improving proper muscle recruitment and activation/motor control patterns, increasing ROM, static and dynamic balance, and kinesthetic sense and proprioception.Patient will continue to benefit from ongoing evaluation and advanced clinical decision from a Physical Therapist to address and improve ROM, muscle strength, normalization of gait, balance and proprioception, functional mobility, and proper body mechanics to safely return to PLOF, ADLs/IADLs, and recreational activity without symptoms or restrictions.    Medical Necessity Documentation:  I certify that this patient meets the below criteria necessary for medical necessity for care and/or justification of therapy services:  The patient has a musculoskeletal condition(s) with a corresponding ICD-10 code that is of complexity and severity that require skilled therapeutic intervention. This has a direct and significant impact on the need for therapy and significantly impacts the rate of recovery.     Return to Play: NA    Prognosis for POC: [x] Good [] Fair  [] Poor    Patient requires continued skilled intervention: [x] Yes  [] No      CHARGE CAPTURE     PT CHARGE GRID  KX Modifier at visit 15  CPT Code (TIMED) minutes # CPT Code (UNTIMED) #     Therex (32974)  30' 1  EVAL:LOW (90373 - Typically 20 minutes face-to-face)     Neuromusc. Re-ed (86650) 15' 1  Re-Eval (19880)     Manual (43306) 10' 1  Estim Unattended (51220)     Ther. Act (53991)    [facility] Traction (20911)     Gait (05269)    Dry Needle 1-2 muscle (20560)     Aquatic Therex (63988)    Dry Needle 3+ muscle (20561)     Iontophoresis (78437)    VASO (92179)     Ultrasound (80656)    Group Therapy (23719)     Estim Attended (32444)    Canalith Repositioning (75890)     Physical Performance Test (84903)

## 2025-03-27 ENCOUNTER — HOSPITAL ENCOUNTER (OUTPATIENT)
Dept: PHYSICAL THERAPY | Age: 75
Setting detail: THERAPIES SERIES
Discharge: HOME | End: 2025-03-27
Attending: ORTHOPAEDIC SURGERY
Payer: MEDICARE

## 2025-03-27 PROCEDURE — 97140 MANUAL THERAPY 1/> REGIONS: CPT

## 2025-03-27 PROCEDURE — 97112 NEUROMUSCULAR REEDUCATION: CPT

## 2025-03-27 PROCEDURE — 97110 THERAPEUTIC EXERCISES: CPT

## 2025-03-27 NOTE — FLOWSHEET NOTE
[facility] - Outpatient Rehabilitation and Therapy: [address], [place] [address] office: [phone] fax: [phone]         Physical Therapy: TREATMENT/PROGRESS NOTE   Patient: Suzi Alas (75 y.o. female)   Examination Date: 2025   :  1950 MRN: [id number]   Visit #: 10  KX Modifier at visit 15  Insurance Allowable Auth Needed   Med Nec    KX Modifier at visit 15 []Yes    []No    Insurance: Payor: MEDICARE / Plan: MEDICARE PART A AND B / Product Type: *No Product type* /   Insurance ID: [id number] - (Medicare)  Secondary Insurance (if applicable): [employer]   Treatment Diagnosis:     ICD-10-CM    1. Chronic pain of right knee  M25.561     G89.29       2. Muscle weakness of lower extremity  M62.81       3. Antalgic gait  R26.89          Medical Diagnosis: R Knee OA M17.11 Primary osteoarthritis of right knee [M17.11] 2025   Referring Physician: Killian Downey MD  PCP: Emmanuel Mccauley DO     Plan of care signed (Y/N): Y    Date of Patient follow up with Physician:      Plan of Care Report: NO  POC update due: (10 visits /OR AUTH LIMITS, whichever is less)  2025                                             Medical History:  Comorbidities:  Cancer/Tumor  Diabetes (Type I or II)  Hypertension  Osteoarthritis  Prior Surgeries: Knee Sx cement  Other: Thyriod CA, Breast CA      Relevant Medical History:   Past Medical History:   Diagnosis Date    Acid reflux     Arthritis     Diabetes mellitus (HCC)     Heart murmur     Hyperlipidemia     Hypertension     Kidney stone     PONV (postoperative nausea and vomiting)     Thyroid cancer (HCC)     S/P thyroidectomy                                              Precautions/ Contra-indications:           Latex allergy:  YES  Pacemaker:    NO  Contraindications for Manipulation: recent surgical history (relative)  Date of Surgery: 2025  Other:    Red Flags:  None    Suicide Screening:   The patient did not

## 2025-03-31 ENCOUNTER — HOSPITAL ENCOUNTER (OUTPATIENT)
Dept: PHYSICAL THERAPY | Age: 75
Setting detail: THERAPIES SERIES
Discharge: HOME | End: 2025-03-31
Attending: ORTHOPAEDIC SURGERY
Payer: MEDICARE

## 2025-03-31 PROCEDURE — 97140 MANUAL THERAPY 1/> REGIONS: CPT

## 2025-03-31 PROCEDURE — 97110 THERAPEUTIC EXERCISES: CPT

## 2025-03-31 PROCEDURE — 97112 NEUROMUSCULAR REEDUCATION: CPT

## 2025-03-31 NOTE — FLOWSHEET NOTE
(20380) activation and proprioception, including postural re-education.    Gait Training (21295) for normalization of ambulation patterns and AD training.   Manual Therapy (97493) as indicated to include: Passive Range of Motion, Gr I-IV mobilizations, Soft Tissue Mobilization, Trigger Point Release, and Myofascial Release  Modalities as needed that may include: Cryotherapy, Electrical Stimulation, Thermal Agents, and Vasoneumatic Compression  Patient education on joint protection, postural re-education, activity modification, and progression of HEP    Plan: Cont POC- Continue emphasis/focus on exercise progression, modulating pain, promoting relaxation, increasing ROM, improving soft tissue extensibility, allowing for proper ROM, static and dynamic balance, and improving postural awareness. Next visit plan to progress weights, progress reps, add new exercises, adjust HEP, and continue current phase     Electronically Signed by Norma Enriquez PT  Date: 03/31/2025     Note: Portions of this note have been templated and/or copied from initial evaluation, reassessments and prior notes for documentation efficiency.    Note: If patient does not return for scheduled/recommended follow up visits, this note will serve as a discharge from care along with the most recent update on progress.    Ortho Evaluation

## 2025-04-03 ENCOUNTER — HOSPITAL ENCOUNTER (OUTPATIENT)
Dept: PHYSICAL THERAPY | Age: 75
Setting detail: THERAPIES SERIES
End: 2025-04-03
Attending: ORTHOPAEDIC SURGERY
Payer: MEDICARE

## 2025-04-07 ENCOUNTER — HOSPITAL ENCOUNTER (OUTPATIENT)
Dept: PHYSICAL THERAPY | Age: 75
Setting detail: THERAPIES SERIES
Discharge: HOME | End: 2025-04-07
Attending: ORTHOPAEDIC SURGERY
Payer: MEDICARE

## 2025-04-07 PROCEDURE — 97110 THERAPEUTIC EXERCISES: CPT

## 2025-04-07 PROCEDURE — 97112 NEUROMUSCULAR REEDUCATION: CPT

## 2025-04-07 PROCEDURE — 97140 MANUAL THERAPY 1/> REGIONS: CPT

## 2025-04-07 NOTE — FLOWSHEET NOTE
[facility] - Outpatient Rehabilitation and Therapy: [address], [place] [address] office: [phone] fax: [phone]         Physical Therapy: TREATMENT/PROGRESS NOTE   Patient: Suzi Alas (75 y.o. female)   Examination Date: 2025   :  1950 MRN: [id number]   Visit #: 12  KX Modifier at visit 15  Insurance Allowable Auth Needed   Med Nec    KX Modifier at visit 15 []Yes    []No    Insurance: Payor: MEDICARE / Plan: MEDICARE PART A AND B / Product Type: *No Product type* /   Insurance ID: [id number] - (Medicare)  Secondary Insurance (if applicable): [employer]   Treatment Diagnosis:     ICD-10-CM    1. Chronic pain of right knee  M25.561     G89.29       2. Muscle weakness of lower extremity  M62.81       3. Antalgic gait  R26.89          Medical Diagnosis: R Knee OA M17.11 Primary osteoarthritis of right knee [M17.11] 2025   Referring Physician: Killian Downey MD  PCP: Emmanuel Mccauley DO     Plan of care signed (Y/N): Y    Date of Patient follow up with Physician:      Plan of Care Report: NO  POC update due: (10 visits /OR AUTH LIMITS, whichever is less)  2025                                             Medical History:  Comorbidities:  Cancer/Tumor  Diabetes (Type I or II)  Hypertension  Osteoarthritis  Prior Surgeries: Knee Sx cement  Other: Thyriod CA, Breast CA      Relevant Medical History:   Past Medical History:   Diagnosis Date    Acid reflux     Arthritis     Diabetes mellitus (HCC)     Heart murmur     Hyperlipidemia     Hypertension     Kidney stone     PONV (postoperative nausea and vomiting)     Thyroid cancer (HCC)     S/P thyroidectomy                                              Precautions/ Contra-indications:           Latex allergy:  YES  Pacemaker:    NO  Contraindications for Manipulation: recent surgical history (relative)  Date of Surgery: 2025  Other:    Red Flags:  None    Suicide Screening:   The patient did not

## 2025-04-09 ENCOUNTER — HOSPITAL ENCOUNTER (OUTPATIENT)
Dept: PHYSICAL THERAPY | Age: 75
Setting detail: THERAPIES SERIES
Discharge: HOME | End: 2025-04-09
Attending: ORTHOPAEDIC SURGERY
Payer: MEDICARE

## 2025-04-09 PROCEDURE — 97112 NEUROMUSCULAR REEDUCATION: CPT

## 2025-04-09 PROCEDURE — 97140 MANUAL THERAPY 1/> REGIONS: CPT

## 2025-04-09 PROCEDURE — 97110 THERAPEUTIC EXERCISES: CPT

## 2025-04-09 NOTE — PLAN OF CARE
modification, and progression of HEP    Plan: Cont POC- Continue emphasis/focus on exercise progression, modulating pain, promoting relaxation, increasing ROM, improving soft tissue extensibility, allowing for proper ROM, static and dynamic balance, and improving postural awareness. Next visit plan to progress weights, progress reps, add new exercises, adjust HEP, and continue current phase     Electronically Signed by Norma Enriquez, PT  Date: 04/09/2025     Note: Portions of this note have been templated and/or copied from initial evaluation, reassessments and prior notes for documentation efficiency.    Note: If patient does not return for scheduled/recommended follow up visits, this note will serve as a discharge from care along with the most recent update on progress.    Ortho Evaluation

## 2025-04-14 ENCOUNTER — HOSPITAL ENCOUNTER (OUTPATIENT)
Dept: PHYSICAL THERAPY | Age: 75
Setting detail: THERAPIES SERIES
Discharge: HOME | End: 2025-04-14
Attending: ORTHOPAEDIC SURGERY
Payer: MEDICARE

## 2025-04-14 PROCEDURE — 97110 THERAPEUTIC EXERCISES: CPT

## 2025-04-14 PROCEDURE — 97112 NEUROMUSCULAR REEDUCATION: CPT

## 2025-04-14 PROCEDURE — 97140 MANUAL THERAPY 1/> REGIONS: CPT

## 2025-04-14 NOTE — FLOWSHEET NOTE
[facility] - Outpatient Rehabilitation and Therapy: [address], [place] [address] office: [phone] fax: [phone]       Physical Therapy: TREATMENT/PROGRESS NOTE   Patient: Suzi Alas (75 y.o. female)   Examination Date: 2025   :  1950 MRN: [id number]   Visit #: 14  KX Modifier at visit 15  Insurance Allowable Auth Needed   Med Nec    KX Modifier at visit 15 []Yes    []No    Insurance: Payor: MEDICARE / Plan: MEDICARE PART A AND B / Product Type: *No Product type* /   Insurance ID: [id number] - (Medicare)  Secondary Insurance (if applicable): [employer]   Treatment Diagnosis:     ICD-10-CM    1. Chronic pain of right knee  M25.561     G89.29       2. Muscle weakness of lower extremity  M62.81       3. Antalgic gait  R26.89          Medical Diagnosis: R Knee OA M17.11 Primary osteoarthritis of right knee [M17.11] 2025   Referring Physician: Killian Downey MD  PCP: Emmanuel Mccauley DO     Plan of care signed (Y/N): Y    Date of Patient follow up with Physician:      Plan of Care Report: NO  POC update due: (10 visits /OR AUTH LIMITS, whichever is less)  2025                                             Medical History:  Comorbidities:  Cancer/Tumor  Diabetes (Type I or II)  Hypertension  Osteoarthritis  Prior Surgeries: Knee Sx cement  Other: Thyriod CA, Breast CA      Relevant Medical History:   Past Medical History:   Diagnosis Date    Acid reflux     Arthritis     Diabetes mellitus (HCC)     Heart murmur     Hyperlipidemia     Hypertension     Kidney stone     PONV (postoperative nausea and vomiting)     Thyroid cancer (HCC)     S/P thyroidectomy                                              Precautions/ Contra-indications:           Latex allergy:  YES  Pacemaker:    NO  Contraindications for Manipulation: recent surgical history (relative)  Date of Surgery: 2025  Other:    Red Flags:  None    Suicide Screening:   The patient did not

## 2025-04-16 ENCOUNTER — HOSPITAL ENCOUNTER (OUTPATIENT)
Dept: PHYSICAL THERAPY | Age: 75
Setting detail: THERAPIES SERIES
Discharge: HOME | End: 2025-04-16
Attending: ORTHOPAEDIC SURGERY
Payer: MEDICARE

## 2025-04-16 PROCEDURE — 97110 THERAPEUTIC EXERCISES: CPT

## 2025-04-16 PROCEDURE — 97140 MANUAL THERAPY 1/> REGIONS: CPT

## 2025-04-16 PROCEDURE — 97112 NEUROMUSCULAR REEDUCATION: CPT

## 2025-04-16 NOTE — FLOWSHEET NOTE
[facility] - Outpatient Rehabilitation and Therapy: [address], [place] [address] office: [phone] fax: [phone]       Physical Therapy: TREATMENT/PROGRESS NOTE   Patient: Suzi Alas (75 y.o. female)   Examination Date: 2025   :  1950 MRN: [id number]   Visit #: 15  KX Modifier at visit 15  Insurance Allowable Auth Needed   Med Nec    KX Modifier at visit 15 []Yes    []No    Insurance: Payor: MEDICARE / Plan: MEDICARE PART A AND B / Product Type: *No Product type* /   Insurance ID: [id number] - (Medicare)  Secondary Insurance (if applicable): MED[employer] M[employer]   Treatment Diagnosis:     ICD-10-CM    1. Chronic pain of right knee  M25.561     G89.29       2. Muscle weakness of lower extremity  M62.81       3. Antalgic gait  R26.89          Medical Diagnosis: R Knee OA M17.11 Primary osteoarthritis of right knee [M17.11] 2025   Referring Physician: Killian Downey MD  PCP: Emmanuel Mccauley DO     Plan of care signed (Y/N): Y    Date of Patient follow up with Physician:      Plan of Care Report: NO  POC update due: (10 visits /OR AUTH LIMITS, whichever is less)  2025                                             Medical History:  Comorbidities:  Cancer/Tumor  Diabetes (Type I or II)  Hypertension  Osteoarthritis  Prior Surgeries: Knee Sx cement  Other: Thyriod CA, Breast CA      Relevant Medical History:   Past Medical History:   Diagnosis Date    Acid reflux     Arthritis     Diabetes mellitus (HCC)     Heart murmur     Hyperlipidemia     Hypertension     Kidney stone     PONV (postoperative nausea and vomiting)     Thyroid cancer (HCC)     S/P thyroidectomy                                              Precautions/ Contra-indications:           Latex allergy:  YES  Pacemaker:    NO  Contraindications for Manipulation: recent surgical history (relative)  Date of Surgery: 2025  Other:    Red Flags:  None    Suicide Screening:   The patient did not

## 2025-04-22 ENCOUNTER — HOSPITAL ENCOUNTER (OUTPATIENT)
Dept: PHYSICAL THERAPY | Age: 75
Setting detail: THERAPIES SERIES
Discharge: HOME | End: 2025-04-22
Attending: ORTHOPAEDIC SURGERY
Payer: MEDICARE

## 2025-04-22 PROCEDURE — 97140 MANUAL THERAPY 1/> REGIONS: CPT

## 2025-04-22 PROCEDURE — 97112 NEUROMUSCULAR REEDUCATION: CPT

## 2025-04-22 PROCEDURE — 97110 THERAPEUTIC EXERCISES: CPT

## 2025-04-22 NOTE — FLOWSHEET NOTE
[facility] - Outpatient Rehabilitation and Therapy: [address], [place] [address] office: [phone] fax: [phone]       Physical Therapy: TREATMENT/PROGRESS NOTE   Patient: Suzi Alas (75 y.o. female)   Examination Date: 2025   :  1950 MRN: [id number]   Visit #: 16  KX Modifier at visit 15  Insurance Allowable Auth Needed   Med Nec    KX Modifier at visit 15 []Yes    []No    Insurance: Payor: MEDICARE / Plan: MEDICARE PART A AND B / Product Type: *No Product type* /   Insurance ID: [id number] - (Medicare)  Secondary Insurance (if applicable): MEDICAL M[employer]   Treatment Diagnosis:     ICD-10-CM    1. Chronic pain of right knee  M25.561     G89.29       2. Muscle weakness of lower extremity  M62.81       3. Antalgic gait  R26.89          Medical Diagnosis: R Knee OA M17.11 Primary osteoarthritis of right knee [M17.11] 2025   Referring Physician: Killian Downey MD  PCP: Emmanuel Mccauley DO     Plan of care signed (Y/N): Y    Date of Patient follow up with Physician:      Plan of Care Report: NO  POC update due: (10 visits /OR AUTH LIMITS, whichever is less)  2025                                             Medical History:  Comorbidities:  Cancer/Tumor  Diabetes (Type I or II)  Hypertension  Osteoarthritis  Prior Surgeries: Knee Sx cement  Other: Thyriod CA, Breast CA      Relevant Medical History:   Past Medical History:   Diagnosis Date    Acid reflux     Arthritis     Diabetes mellitus (HCC)     Heart murmur     Hyperlipidemia     Hypertension     Kidney stone     PONV (postoperative nausea and vomiting)     Thyroid cancer (HCC)     S/P thyroidectomy                                              Precautions/ Contra-indications:           Latex allergy:  YES  Pacemaker:    NO  Contraindications for Manipulation: recent surgical history (relative)  Date of Surgery: 2025  Other:    Red Flags:  None    Suicide Screening:   The patient did not

## 2025-04-24 ENCOUNTER — HOSPITAL ENCOUNTER (OUTPATIENT)
Dept: PHYSICAL THERAPY | Age: 75
Setting detail: THERAPIES SERIES
Discharge: HOME | End: 2025-04-24
Attending: ORTHOPAEDIC SURGERY
Payer: MEDICARE

## 2025-04-24 PROCEDURE — 97140 MANUAL THERAPY 1/> REGIONS: CPT

## 2025-04-24 PROCEDURE — 97112 NEUROMUSCULAR REEDUCATION: CPT

## 2025-04-24 PROCEDURE — 97110 THERAPEUTIC EXERCISES: CPT

## 2025-04-24 NOTE — DISCHARGE SUMMARY
[email or web address]  Access Code: [email or web address]  URL: [email or web address]  Date: 02/26/2025  Prepared by: Norma Enriquez     Exercises  - Long Sitting Calf Stretch with Strap  - 2 x daily - 7 x weekly - 3-5 reps - 30 seconds hold  - Long Sitting Quad Set  - 2 x daily - 7 x weekly - 10 reps - 10 seconds hold  - Seated Long Arc Quad  - 2 x daily - 7 x weekly - 3 sets - 10 reps - 1 hold  - Mini Squat with Counter Support  - 2 x daily - 7 x weekly - 3 sets - 10 reps  - Supine Heel Slide with Strap  - 1 x daily - 7 x weekly - 1 sets - 10 reps - 10\" hold  - Small Range Straight Leg Raise  - 1 x daily - 7 x weekly - 3 sets - 10 reps  - Seated Knee Flexion AAROM  - 1 x daily - 7 x weekly - 1 sets - 10 reps - 10\" hold  - Seated Hamstring Stretch  - 1 x daily - 7 x weekly - 1 sets - 3 reps - 30\" hold    ASSESSMENT       Today's Assessment: TRANSITION TO HEP/DC FORMAL PT: Patient is currently independent with symptom management and home exercise program. Patient reports understanding of the importance of continued compliance with home exercise program after discharge.  Patient has reached 5/6 long term goals and should reach all additional goals with compliance to home exercise program upon discharge.      Medical Necessity Documentation:  I certify that this patient meets the below criteria necessary for medical necessity for care and/or justification of therapy services:  The patient has a musculoskeletal condition(s) with a corresponding ICD-10 code that is of complexity and severity that require skilled therapeutic intervention. This has a direct and significant impact on the need for therapy and significantly impacts the rate of recovery.     Return to Play: NA    Prognosis for POC: [x] Good [] Fair  [] Poor    Patient requires continued skilled intervention: [x] Yes  [] No      CHARGE CAPTURE     PT CHARGE GRID  KX Modifier at visit 15  CPT Code (TIMED) minutes # CPT Code (UNTIMED) #     Therex (87999)  35' 2 kx  EVAL:LOW

## 2025-05-14 ENCOUNTER — OFFICE VISIT (OUTPATIENT)
Dept: ORTHOPEDIC SURGERY | Age: 75
End: 2025-05-14
Payer: MEDICARE

## 2025-05-14 DIAGNOSIS — M22.42 CHONDROMALACIA OF LEFT PATELLA: ICD-10-CM

## 2025-05-14 DIAGNOSIS — M17.12 PRIMARY OSTEOARTHRITIS OF LEFT KNEE: ICD-10-CM

## 2025-05-14 DIAGNOSIS — M25.562 ACUTE PAIN OF LEFT KNEE: Primary | ICD-10-CM

## 2025-05-14 PROCEDURE — G8399 PT W/DXA RESULTS DOCUMENT: HCPCS | Performed by: FAMILY MEDICINE

## 2025-05-14 PROCEDURE — 1123F ACP DISCUSS/DSCN MKR DOCD: CPT | Performed by: FAMILY MEDICINE

## 2025-05-14 PROCEDURE — 99214 OFFICE O/P EST MOD 30 MIN: CPT | Performed by: FAMILY MEDICINE

## 2025-05-14 PROCEDURE — G8417 CALC BMI ABV UP PARAM F/U: HCPCS | Performed by: FAMILY MEDICINE

## 2025-05-14 PROCEDURE — 1090F PRES/ABSN URINE INCON ASSESS: CPT | Performed by: FAMILY MEDICINE

## 2025-05-14 PROCEDURE — 1036F TOBACCO NON-USER: CPT | Performed by: FAMILY MEDICINE

## 2025-05-14 PROCEDURE — G8428 CUR MEDS NOT DOCUMENT: HCPCS | Performed by: FAMILY MEDICINE

## 2025-05-14 PROCEDURE — 20610 DRAIN/INJ JOINT/BURSA W/O US: CPT | Performed by: FAMILY MEDICINE

## 2025-05-14 RX ORDER — BUPIVACAINE HYDROCHLORIDE 2.5 MG/ML
2 INJECTION, SOLUTION INFILTRATION; PERINEURAL ONCE
Status: COMPLETED | OUTPATIENT
Start: 2025-05-14 | End: 2025-05-14

## 2025-05-14 RX ORDER — BETAMETHASONE SODIUM PHOSPHATE AND BETAMETHASONE ACETATE 3; 3 MG/ML; MG/ML
12 INJECTION, SUSPENSION INTRA-ARTICULAR; INTRALESIONAL; INTRAMUSCULAR; SOFT TISSUE ONCE
Status: COMPLETED | OUTPATIENT
Start: 2025-05-14 | End: 2025-05-14

## 2025-05-14 RX ORDER — CELECOXIB 200 MG/1
200 CAPSULE ORAL DAILY
Qty: 30 CAPSULE | Refills: 3 | Status: SHIPPED | OUTPATIENT
Start: 2025-05-14

## 2025-05-14 RX ADMIN — BETAMETHASONE SODIUM PHOSPHATE AND BETAMETHASONE ACETATE 12 MG: 3; 3 INJECTION, SUSPENSION INTRA-ARTICULAR; INTRALESIONAL; INTRAMUSCULAR; SOFT TISSUE at 13:36

## 2025-05-14 RX ADMIN — Medication 1 ML: at 13:38

## 2025-05-14 RX ADMIN — BUPIVACAINE HYDROCHLORIDE 5 MG: 2.5 INJECTION, SOLUTION INFILTRATION; PERINEURAL at 13:37

## 2025-05-14 NOTE — PROGRESS NOTES
Chief Complaint  Knee Pain (OPNP L KNEE)    Initial evaluation newer onset left anterior lateral knee pain with known history of significant lateral compartment osteoarthritis patellofemoral arthropathy with left quad pain since roughly 4/24/2025.      Patient is status post successful right total knee arthroplasty per Dr. Downey 2/24/2025.  Doing well.   History of Present Illness:  Suzi Alas is a 75 y.o. female   Patient presents to office after a visit to urgent care.  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 Emmanuel Anne who is being seen today upon referral from Quintin Molina PA-C for evaluation of an injury to her left greater than right knee after slipping on her cane at home on 11/17/2023.  She states that she fell on November 17 after coming in from the rain.  She attempted to dry her cane on the rug however when she put it on the ceramic tile and tried to take a step forward it went out from under her.  She states that she slammed her knee into the ceramic tile and twisted her ankle on the left.  She states that her ankle hurt at that time but that the knee did not start to hurt until 5 days later.  She states she did not hear any pop, crack, or snap.  She states that her knee did not swell up.  She states that since the initial fall and visit to urgent care she is about 50% better.  She has been icing her knee and taking meloxicam since last Tuesday.  She follows with Dr. Keene usually.  She is interested in getting a cortisone injection for the left knee and gel injections for both knees in the future.  She states that when she is sitting she does not have any pain but when she stands for too long she has a 4-5 out of 10.  She states that she had breast cancer back in February and is on anastrozole 1 mg daily.  She states her sugar typically goes into the 300s for 3 days after a single cortisone shot.  She is being

## 2025-05-21 ENCOUNTER — OFFICE VISIT (OUTPATIENT)
Dept: ORTHOPEDIC SURGERY | Age: 75
End: 2025-05-21

## 2025-05-21 DIAGNOSIS — M25.562 ACUTE PAIN OF LEFT KNEE: Primary | ICD-10-CM

## 2025-05-21 DIAGNOSIS — M22.42 CHONDROMALACIA OF LEFT PATELLA: ICD-10-CM

## 2025-05-21 DIAGNOSIS — M17.12 PRIMARY OSTEOARTHRITIS OF LEFT KNEE: ICD-10-CM

## 2025-05-21 NOTE — PROGRESS NOTES
Chief Complaint  Knee Pain (FU left knee )    Initial evaluation newer onset left anterior lateral knee pain with known history of significant lateral compartment osteoarthritis patellofemoral arthropathy with left quad pain since roughly 4/24/2025.      Patient is status post successful right total knee arthroplasty per Dr. Downey 2/24/2025.  Doing well.     History of Present Illness:  Suzi Alas is a 75 y.o. female   Patient presents to office after a visit to urgent care.  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 Emmanuel Anne who is being seen today upon referral from Quintin Molina PA-C for evaluation of an injury to her left greater than right knee after slipping on her cane at home on 11/17/2023.  She states that she fell on November 17 after coming in from the rain.  She attempted to dry her cane on the rug however when she put it on the ceramic tile and tried to take a step forward it went out from under her.  She states that she slammed her knee into the ceramic tile and twisted her ankle on the left.  She states that her ankle hurt at that time but that the knee did not start to hurt until 5 days later.  She states she did not hear any pop, crack, or snap.  She states that her knee did not swell up.  She states that since the initial fall and visit to urgent care she is about 50% better.  She has been icing her knee and taking meloxicam since last Tuesday.  She follows with Dr. Keene usually.  She is interested in getting a cortisone injection for the left knee and gel injections for both knees in the future.  She states that when she is sitting she does not have any pain but when she stands for too long she has a 4-5 out of 10.  She states that she had breast cancer back in February and is on anastrozole 1 mg daily.  She states her sugar typically goes into the 300s for 3 days after a single cortisone shot.  She is

## 2025-05-28 ENCOUNTER — OFFICE VISIT (OUTPATIENT)
Dept: ORTHOPEDIC SURGERY | Age: 75
End: 2025-05-28
Payer: MEDICARE

## 2025-05-28 VITALS — HEIGHT: 61 IN | BODY MASS INDEX: 33.42 KG/M2 | WEIGHT: 177 LBS

## 2025-05-28 DIAGNOSIS — M17.12 PRIMARY OSTEOARTHRITIS OF LEFT KNEE: ICD-10-CM

## 2025-05-28 DIAGNOSIS — M25.562 ACUTE PAIN OF LEFT KNEE: Primary | ICD-10-CM

## 2025-05-28 DIAGNOSIS — M22.42 CHONDROMALACIA OF LEFT PATELLA: ICD-10-CM

## 2025-05-28 PROCEDURE — 20610 DRAIN/INJ JOINT/BURSA W/O US: CPT | Performed by: FAMILY MEDICINE

## 2025-06-04 ENCOUNTER — OFFICE VISIT (OUTPATIENT)
Dept: ORTHOPEDIC SURGERY | Age: 75
End: 2025-06-04
Payer: MEDICARE

## 2025-06-04 VITALS — WEIGHT: 177 LBS | HEIGHT: 61 IN | BODY MASS INDEX: 33.42 KG/M2

## 2025-06-04 DIAGNOSIS — M17.12 PRIMARY OSTEOARTHRITIS OF LEFT KNEE: ICD-10-CM

## 2025-06-04 DIAGNOSIS — M25.562 ACUTE PAIN OF LEFT KNEE: Primary | ICD-10-CM

## 2025-06-04 DIAGNOSIS — M22.42 CHONDROMALACIA OF LEFT PATELLA: ICD-10-CM

## 2025-06-04 PROCEDURE — 20610 DRAIN/INJ JOINT/BURSA W/O US: CPT | Performed by: FAMILY MEDICINE

## 2025-06-12 ENCOUNTER — OFFICE VISIT (OUTPATIENT)
Dept: ORTHOPEDIC SURGERY | Age: 75
End: 2025-06-12
Payer: MEDICARE

## 2025-06-12 VITALS — HEIGHT: 61 IN | BODY MASS INDEX: 33.42 KG/M2 | WEIGHT: 177 LBS

## 2025-06-12 DIAGNOSIS — Z96.651 S/P TOTAL KNEE ARTHROPLASTY, RIGHT: Primary | ICD-10-CM

## 2025-06-12 PROCEDURE — 1090F PRES/ABSN URINE INCON ASSESS: CPT | Performed by: ORTHOPAEDIC SURGERY

## 2025-06-12 PROCEDURE — 1159F MED LIST DOCD IN RCRD: CPT | Performed by: ORTHOPAEDIC SURGERY

## 2025-06-12 PROCEDURE — 1123F ACP DISCUSS/DSCN MKR DOCD: CPT | Performed by: ORTHOPAEDIC SURGERY

## 2025-06-12 PROCEDURE — 99213 OFFICE O/P EST LOW 20 MIN: CPT | Performed by: ORTHOPAEDIC SURGERY

## 2025-06-12 PROCEDURE — G8417 CALC BMI ABV UP PARAM F/U: HCPCS | Performed by: ORTHOPAEDIC SURGERY

## 2025-06-12 PROCEDURE — 1036F TOBACCO NON-USER: CPT | Performed by: ORTHOPAEDIC SURGERY

## 2025-06-12 PROCEDURE — G8427 DOCREV CUR MEDS BY ELIG CLIN: HCPCS | Performed by: ORTHOPAEDIC SURGERY

## 2025-06-12 PROCEDURE — G8399 PT W/DXA RESULTS DOCUMENT: HCPCS | Performed by: ORTHOPAEDIC SURGERY

## 2025-06-12 NOTE — PROGRESS NOTES
Dr Killian Downey      Date /Time 6/12/2025       10:08 AM EDT  Name Suzi Alas             1950   Location  [facility]TH[facility]  MRN [id number]                Chief Complaint   Patient presents with    Follow-up     Ck Right TKA 02/24/2025        History of Present Illness      Suzi Alas is a 75 y.o. female is here for post-op visit after RIGHT  11821 Total Knee Arthroplasty    Patient presents to the office today for follow-up visit.  Patient's status post total knee arthroplasty.  Patient doing well.  Patient denies fever, chills, or drainage.  Pain controlled.    Physical Exam    Based off 1997 Exam Criteria    Ht 1.549 m (5' 1\")   Wt 80.3 kg (177 lb)   BMI 33.44 kg/m²      Constitutional:       General: He is not in acute distress.     Appearance: Normal appearance.     RIGHT Knee: incision clean, intact, healing appropriately. No surrounding  erythema or fluctuance. Neuro intact distal. No evidence of DVT.    Range of motion:3-120    Imaging       X-rays were ordered and reviewed of the right knee.  3 views.  Standing AP, lateral, and skyline views.  They demonstrate a right total knee arthroplasty in good position.  No evidence of loosening or periprosthetic fracture.    Assessment and Plan    Suzi was seen today for follow-up.    Diagnoses and all orders for this visit:    S/P total knee arthroplasty, right  -     XR KNEE RIGHT (3 VIEWS); Future        Patient doing well.  Continue home exercises.  Follow-up 1 year from surgery or sooner if problems arise.  Discussed predental and preinvasive procedure antibiotics.    I discussed with Suzi Alas that her history, symptoms, signs, and imaging are most consistent with previous TKA replacement.    We reviewed the natural history of these conditions and treatment options ranging from conservative measures (rest, icing, activity modification, physical therapy, pain meds) to surgical options.     In terms of treatment, I

## (undated) DEVICE — ZIMMER® A.T.S. CYCLINDRICAL TOURNIQUET CUFF, SINGLE PORT, SINGLE BLADDER 30 IN. 76 CM)

## (undated) DEVICE — OPTIFOAM GENTLE SA, POSTOP, 4X12: Brand: MEDLINE

## (undated) DEVICE — STERILE PATIENT PROTECTIVE PAD FOR IMP® KNEE POSITIONERS & COHESIVE WRAP (10 / CASE): Brand: DE MAYO KNEE POSITIONER®

## (undated) DEVICE — BLADE SHV L13CM DIA4MM EXCALIBUR AGG COOLCUT

## (undated) DEVICE — TOWEL,STOP FLAG GOLD N-W: Brand: MEDLINE

## (undated) DEVICE — 3M™ TEGADERM™ CHG CHLORHEXIDINE GLUCONATE GEL PAD 1664, 25 EACH/CARTON, 4 CARTONS/CASE: Brand: 3M™ TEGADERM™

## (undated) DEVICE — DRAPE C ARM UNIV W41XL74IN CLR PLAS XR VELC CLSR POLY STRP

## (undated) DEVICE — Z INACTIVE USE 2660664 SOLUTION IRRIG 3000ML 0.9% SOD CHL USP UROMATIC PLAS CONT

## (undated) DEVICE — INTENDED USE FOR SURGICAL MARKING ON INTACT SKIN, ALSO PROVIDES A PERMANENT METHOD OF IDENTIFYING OBJECTS IN THE OPERATING ROOM: Brand: WRITESITE® PLUS MINI PREP RESISTANT MARKER

## (undated) DEVICE — DRAIN,WOUND,15FR,3/16,FULL-FLUTED: Brand: MEDLINE

## (undated) DEVICE — IMMOBILIZER KNEE L20IN AD 1 SZ FIT MOST UNIV WRP ARND OPN

## (undated) DEVICE — PROVE COVER: Brand: UNBRANDED

## (undated) DEVICE — SHEET,DRAPE,40X58,STERILE: Brand: MEDLINE

## (undated) DEVICE — CLIP LIG M BLU TI HRT SHP WIRE HORZ 600 PER BX

## (undated) DEVICE — GLOVE ORTHO 7 1/2   MSG9475

## (undated) DEVICE — SCREW BNE HD 3.5X48 MM HEX PERSONA (NOT IMPLANTED)

## (undated) DEVICE — SYRINGE MED 30ML STD CLR PLAS LUERLOCK TIP N CTRL DISP

## (undated) DEVICE — MINOR BREAST: Brand: MEDLINE INDUSTRIES, INC.

## (undated) DEVICE — SUTURE STRATAFIX SPRL SZ 2 0 L14IN ABSRB UD MH L36MM 1 2 CIR SXMD2B401

## (undated) DEVICE — Device

## (undated) DEVICE — SOLUTION IRRIG 2000ML 0.9% SOD CHL USP UROMATIC PLAS CONT

## (undated) DEVICE — HANDPIECE SET WITH HIGH FLOW TIP AND SUCTION TUBE: Brand: INTERPULSE

## (undated) DEVICE — GAUZE,SPONGE,4"X4",16PLY,XRAY,STRL,LF: Brand: MEDLINE

## (undated) DEVICE — GLOVE,SURG,SENSICARE,ALOE,LF,PF,7: Brand: MEDLINE

## (undated) DEVICE — STAPLER SKIN H3.9MM WIRE DIA0.58MM CRWN 6.9MM 35 STPL ROT

## (undated) DEVICE — RESERVOIR,SUCTION,100CC,SILICONE: Brand: MEDLINE

## (undated) DEVICE — PROBE LOCALIZER W/DRAPE

## (undated) DEVICE — GOWN SIRUS NONREIN XL W/TWL: Brand: MEDLINE INDUSTRIES, INC.

## (undated) DEVICE — GLOVE SURG SZ 8 L12IN FNGR THK79MIL GRN LTX FREE

## (undated) DEVICE — DUAL CUT SAGITTAL BLADE

## (undated) DEVICE — GOWN,SIRUS,POLYRNF,BRTHSLV,LG,30/CS: Brand: MEDLINE

## (undated) DEVICE — GLOVE SURG SZ 7 L12IN FNGR THK79MIL GRN LTX FREE

## (undated) DEVICE — APPLICATOR MEDICATED 26 CC SOLUTION HI LT ORNG CHLORAPREP

## (undated) DEVICE — SUTURE PROL SZ 5-0 L18IN NONABSORBABLE BLU L13MM P-3 3/8 8698G

## (undated) DEVICE — SPECIMEN ORIENTATION CHARMS, SIX DISTINCTLY SHAPED STERILE 10MM CHARMS: Brand: MARGINMAP

## (undated) DEVICE — DRAPE,CHEST,FENES,15X10,STERIL: Brand: MEDLINE

## (undated) DEVICE — SUTURE PERMA-HAND SZ 2-0 L30IN NONABSORBABLE BLK L30MM FSL 679H

## (undated) DEVICE — HDLESS TRC DRILL PIN 75MM BX4

## (undated) DEVICE — SUTURE VCRL SZ 3-0 L27IN ABSRB UD L26MM SH 1/2 CIR J416H

## (undated) DEVICE — SUTURE MONOCRYL STRATAFIX SPRL SZ 3-0 L12IN ABSRB UD FS-1 L30X30CM SXMP2B410

## (undated) DEVICE — TUBING PMP IRRIG GOFLO

## (undated) DEVICE — SYRINGE,TOOMEY,IRRIGATION,70CC,STERILE: Brand: MEDLINE

## (undated) DEVICE — SUTURE NONABSORBABLE MONOFILAMENT 4-0 FS-2 18 IN ETHILON 662H

## (undated) DEVICE — SUTURE ABSORBABLE MONOFILAMENT 1 CTX 45 CM 48 MM DA VIO PDS+

## (undated) DEVICE — ADHESIVE SKIN CLOSURE WND 8.661X1.5 IN 22 CM LIQUIBAND SECUR

## (undated) DEVICE — SUTURE MCRYL SZ 4-0 L27IN ABSRB UD L19MM PS-2 1/2 CIR PRIM Y426H

## (undated) DEVICE — INSTRUMENT SCREW BNE L25MM DIA2.5MM KNEE FULL THRD HEX FEM PERSONA

## (undated) DEVICE — GLOVE ORANGE PI 8 1/2   MSG9085

## (undated) DEVICE — HOOD: Brand: FLYTE

## (undated) DEVICE — HOOD, PEEL-AWAY: Brand: FLYTE

## (undated) DEVICE — NEEDLE SPNL 20GA L3.5IN YEL HUB S STL REG WALL FIT STYL

## (undated) DEVICE — APPLICATOR PREP 26ML 0.7% IOD POVACRYLEX 74% ISO ALC ST

## (undated) DEVICE — PADDING CAST W6INXL4YD NONSTERILE COT RAYON MICROPLEATED

## (undated) DEVICE — SUTURE ETHIBOND EXCEL SZ 2 L30IN NONABSORBABLE GRN L40MM V-37 MX69G

## (undated) DEVICE — 3 BONE CEMENT MIXER: Brand: MIXEVAC